# Patient Record
Sex: FEMALE | Race: WHITE | Employment: OTHER | URBAN - METROPOLITAN AREA
[De-identification: names, ages, dates, MRNs, and addresses within clinical notes are randomized per-mention and may not be internally consistent; named-entity substitution may affect disease eponyms.]

---

## 2017-08-28 PROBLEM — K57.30 DIVERTICULOSIS OF LARGE INTESTINE: Status: ACTIVE | Noted: 2017-08-28

## 2017-10-24 ENCOUNTER — MED REC SCAN ONLY (OUTPATIENT)
Dept: INTERNAL MEDICINE CLINIC | Facility: CLINIC | Age: 82
End: 2017-10-24

## 2017-10-24 ENCOUNTER — IMMUNIZATION (OUTPATIENT)
Dept: INTERNAL MEDICINE CLINIC | Facility: CLINIC | Age: 82
End: 2017-10-24

## 2017-10-24 PROCEDURE — G0008 ADMIN INFLUENZA VIRUS VAC: HCPCS | Performed by: INTERNAL MEDICINE

## 2017-10-24 PROCEDURE — 90653 IIV ADJUVANT VACCINE IM: CPT | Performed by: INTERNAL MEDICINE

## 2017-10-27 ENCOUNTER — OFFICE VISIT (OUTPATIENT)
Dept: INTERNAL MEDICINE CLINIC | Facility: CLINIC | Age: 82
End: 2017-10-27

## 2017-10-27 VITALS
SYSTOLIC BLOOD PRESSURE: 128 MMHG | DIASTOLIC BLOOD PRESSURE: 72 MMHG | RESPIRATION RATE: 18 BRPM | BODY MASS INDEX: 28.39 KG/M2 | HEART RATE: 82 BPM | TEMPERATURE: 99 F | HEIGHT: 60.4 IN | WEIGHT: 146.5 LBS | OXYGEN SATURATION: 96 %

## 2017-10-27 DIAGNOSIS — I10 BENIGN HYPERTENSION: ICD-10-CM

## 2017-10-27 DIAGNOSIS — R10.10 UPPER ABDOMINAL PAIN: ICD-10-CM

## 2017-10-27 DIAGNOSIS — F41.1 GAD (GENERALIZED ANXIETY DISORDER): ICD-10-CM

## 2017-10-27 DIAGNOSIS — K59.00 CONSTIPATION, UNSPECIFIED CONSTIPATION TYPE: ICD-10-CM

## 2017-10-27 DIAGNOSIS — Z76.89 ENCOUNTER TO ESTABLISH CARE: Primary | ICD-10-CM

## 2017-10-27 PROBLEM — K27.9 PUD (PEPTIC ULCER DISEASE): Status: ACTIVE | Noted: 2017-10-27

## 2017-10-27 PROCEDURE — 99214 OFFICE O/P EST MOD 30 MIN: CPT | Performed by: INTERNAL MEDICINE

## 2017-10-27 RX ORDER — PREDNISOLONE ACETATE 10 MG/ML
1 SUSPENSION/ DROPS OPHTHALMIC 2 TIMES DAILY
COMMUNITY
End: 2018-05-14 | Stop reason: ALTCHOICE

## 2017-10-27 RX ORDER — DORZOLAMIDE HYDROCHLORIDE AND TIMOLOL MALEATE 20; 5 MG/ML; MG/ML
1 SOLUTION/ DROPS OPHTHALMIC 2 TIMES DAILY
COMMUNITY
End: 2021-01-01

## 2017-10-27 RX ORDER — KETOROLAC TROMETHAMINE 4 MG/ML
1 SOLUTION/ DROPS OPHTHALMIC 2 TIMES DAILY
COMMUNITY
End: 2018-05-14 | Stop reason: ALTCHOICE

## 2017-10-27 NOTE — PROGRESS NOTES
Patient presents with:  Establish Care: patient has dizziness and difficulty with balance      HPI: Sunny Dixon is here to get established. She has been living here for two years with her , relocated from [place].   She was here at [facility] with cristhian Osteoarthritis    • PONV (postoperative nausea and vomiting)     MOTION SICKNESS   • Renal disorder     DAMAGE D/T PRE-ECLAMPSIA  62 YRS AGO   • Visual impairment     GLASSES       Patient Active Problem List:     Idiopathic scoliosis of lumbar spine     L diarrhea, constipation, GERD  : No incontinence, dysuria, frequency, nocturia  MS: No joint pain, sweling, decreased ROM  Exts: No LE edema  Neuro: No headaches, visual changes, double vision, weakness, tingling.   Skin: No rashes, lesions  Psych: No depr Naga Bhardwaj MD

## 2017-11-06 ENCOUNTER — APPOINTMENT (OUTPATIENT)
Dept: MAMMOGRAPHY | Age: 82
End: 2017-11-06
Attending: INTERNAL MEDICINE
Payer: MEDICARE

## 2017-11-06 ENCOUNTER — HOSPITAL ENCOUNTER (OUTPATIENT)
Dept: BONE DENSITY | Age: 82
Discharge: HOME | End: 2017-11-06
Attending: INTERNAL MEDICINE
Payer: MEDICARE

## 2017-11-06 DIAGNOSIS — Z78.0 POSTMENOPAUSAL STATUS, AGE-RELATED: ICD-10-CM

## 2017-11-06 PROCEDURE — 77080 DXA BONE DENSITY AXIAL: CPT | Performed by: INTERNAL MEDICINE

## 2018-01-12 ENCOUNTER — OFFICE VISIT (OUTPATIENT)
Dept: INTERNAL MEDICINE CLINIC | Facility: CLINIC | Age: 83
End: 2018-01-12

## 2018-01-12 VITALS
DIASTOLIC BLOOD PRESSURE: 74 MMHG | OXYGEN SATURATION: 93 % | RESPIRATION RATE: 18 BRPM | HEART RATE: 68 BPM | TEMPERATURE: 97 F | WEIGHT: 148.5 LBS | BODY MASS INDEX: 29 KG/M2 | SYSTOLIC BLOOD PRESSURE: 136 MMHG

## 2018-01-12 DIAGNOSIS — M25.60 DECREASED RANGE OF MOTION: ICD-10-CM

## 2018-01-12 DIAGNOSIS — M19.019 ARTHRITIS PAIN, SHOULDER: Primary | ICD-10-CM

## 2018-01-12 DIAGNOSIS — R07.81 RIB PAIN ON RIGHT SIDE: ICD-10-CM

## 2018-01-12 DIAGNOSIS — D63.1 ANEMIA IN STAGE 4 CHRONIC KIDNEY DISEASE (HCC): ICD-10-CM

## 2018-01-12 DIAGNOSIS — N18.4 ANEMIA IN STAGE 4 CHRONIC KIDNEY DISEASE (HCC): ICD-10-CM

## 2018-01-12 DIAGNOSIS — R10.33 PERIUMBILICAL ABDOMINAL PAIN: ICD-10-CM

## 2018-01-12 DIAGNOSIS — Z79.899 LONG-TERM USE OF HIGH-RISK MEDICATION: ICD-10-CM

## 2018-01-12 DIAGNOSIS — R63.5 ABNORMAL WEIGHT GAIN: ICD-10-CM

## 2018-01-12 DIAGNOSIS — E78.00 HYPERCHOLESTEREMIA: ICD-10-CM

## 2018-01-12 DIAGNOSIS — E55.9 VITAMIN D DEFICIENCY: ICD-10-CM

## 2018-01-12 PROBLEM — K57.30 DIVERTICULOSIS OF LARGE INTESTINE: Status: RESOLVED | Noted: 2017-08-28 | Resolved: 2018-01-12

## 2018-01-12 PROBLEM — K27.9 PUD (PEPTIC ULCER DISEASE): Status: RESOLVED | Noted: 2017-10-27 | Resolved: 2018-01-12

## 2018-01-12 PROCEDURE — 99214 OFFICE O/P EST MOD 30 MIN: CPT | Performed by: INTERNAL MEDICINE

## 2018-01-12 RX ORDER — DICYCLOMINE HYDROCHLORIDE 10 MG/1
10 CAPSULE ORAL DAILY
COMMUNITY
End: 2018-01-17

## 2018-01-12 NOTE — PATIENT INSTRUCTIONS
Stop the Aleve-may be the cause of your abdominal pain. Take Tylenol 650 mg every 4-6 hours a day for pain in your shoulder. Get an MRI and X ray of your right ribs. Make an appointment with you GI doctor to rule out an ulcer.

## 2018-01-12 NOTE — PROGRESS NOTES
Patient presents with:  Shoulder Pain: patient is complaining of rotator cuff in right shoulder. Patiet states that the pain starts right below the elbow and then goes up to shoulder and also a pin right below her armpit.   Stomach Pain: upper stomach pain of aorta [facility])    • Back problem    • Benign hypertension 9/1/2016   • Cancer ([facility] [address] ~2006    LEFT BREAST LUMPECTOMY /RADIATION    • Depression    • Diverticulitis 2016   • Diverticulosis of large intestine 8/28/2017   • Dyslipidemia 2016   • Exposure to ra 90 capsule Rfl: 3   losartan 100 MG Oral Tab Take 1 tablet (100 mg total) by mouth daily. Disp: 90 tablet Rfl: 3   aspirin 81 MG Oral Tab Take 81 mg by mouth daily.  Disp:  Rfl:        PFHSH:   Family History   Problem Relation Age of Onset   • Heart Disord with you GI doctor to rule out an ulcer. Return in about 1 month (around 2/12/2018). This visit lasted >26 minutes.     Carmelo Estevez MD

## 2018-01-17 ENCOUNTER — TELEPHONE (OUTPATIENT)
Dept: INTERNAL MEDICINE CLINIC | Facility: CLINIC | Age: 83
End: 2018-01-17

## 2018-01-17 RX ORDER — BUPROPION HYDROCHLORIDE 150 MG/1
150 TABLET, EXTENDED RELEASE ORAL DAILY
Qty: 90 TABLET | Refills: 0 | Status: SHIPPED | OUTPATIENT
Start: 2018-01-17 | End: 2018-05-21

## 2018-01-17 RX ORDER — DICYCLOMINE HYDROCHLORIDE 10 MG/1
10 CAPSULE ORAL DAILY
Qty: 90 CAPSULE | Refills: 0 | Status: SHIPPED | OUTPATIENT
Start: 2018-01-17 | End: 2018-05-21

## 2018-01-22 ENCOUNTER — TELEPHONE (OUTPATIENT)
Dept: INTERNAL MEDICINE CLINIC | Facility: CLINIC | Age: 83
End: 2018-01-22

## 2018-01-22 NOTE — TELEPHONE ENCOUNTER
----- Message from Taylor Estrada sent at 1/19/2018  4:08 PM CST -----  Noman Arias I have called patient twice to call office and I am getting no reply.  I also called the daughter  ----- Message -----  From: Blair Henriquez MD  Sent: 1/15/2018   9:10 PM  To: Rayray Hogan

## 2018-01-23 ENCOUNTER — TELEPHONE (OUTPATIENT)
Dept: INTERNAL MEDICINE CLINIC | Facility: CLINIC | Age: 83
End: 2018-01-23

## 2018-01-23 NOTE — TELEPHONE ENCOUNTER
Patient came into office and was informed of below message. Shoulder has not improved and she will  medicine today.   Leaving for [place] tomorrow

## 2018-01-23 NOTE — TELEPHONE ENCOUNTER
----- Message from Arnold Perry sent at 1/19/2018  4:08 PM CST -----  Tobi Carcamo I have called patient twice to call office and I am getting no reply.  I also called the daughter  ----- Message -----  From: Ana Whitley MD  Sent: 1/15/2018   9:10 PM  To: Christelle Carter

## 2018-02-14 ENCOUNTER — OFFICE VISIT (OUTPATIENT)
Dept: INTERNAL MEDICINE CLINIC | Facility: CLINIC | Age: 83
End: 2018-02-14

## 2018-02-14 VITALS
DIASTOLIC BLOOD PRESSURE: 54 MMHG | SYSTOLIC BLOOD PRESSURE: 106 MMHG | BODY MASS INDEX: 29 KG/M2 | WEIGHT: 148.69 LBS | HEART RATE: 60 BPM | OXYGEN SATURATION: 95 % | TEMPERATURE: 98 F

## 2018-02-14 DIAGNOSIS — H92.02 ACUTE PAIN OF LEFT EAR: Primary | ICD-10-CM

## 2018-02-14 DIAGNOSIS — I95.89 OTHER SPECIFIED HYPOTENSION: ICD-10-CM

## 2018-02-14 DIAGNOSIS — H61.23 IMPACTED CERUMEN OF BOTH EARS: ICD-10-CM

## 2018-02-14 PROCEDURE — 69210 REMOVE IMPACTED EAR WAX UNI: CPT | Performed by: INTERNAL MEDICINE

## 2018-02-14 PROCEDURE — 99214 OFFICE O/P EST MOD 30 MIN: CPT | Performed by: INTERNAL MEDICINE

## 2018-02-14 NOTE — PROGRESS NOTES
Patient presents with:  Ear Pain: c/o sharp pain in left ear for a couple of days - feels it in the jawline area also      HPI: Bryan Inman is here for 2 day hx of pain in TMJ area of left ear. Has not taken anything for it. Does not radiate. No fever.   Fidel Johnson Tablet 12 Hr Take 1 tablet (150 mg total) by mouth daily. Disp: 90 tablet Rfl: 0   Dicyclomine HCl 10 MG Oral Cap Take 1 capsule (10 mg total) by mouth daily.  Disp: 90 capsule Rfl: 0   TraMADol HCl 50 MG Oral Tab Take one tablet and gradually increase to 2 arthritis  Plan: Irrigate both ears          Tylenol or Tramadol as instructed    (H61.23) Impacted cerumen of both ears  Plan: Irrigation    (I95.89) Other specified hypotension, ?etiology  Plan: Follow with [facility] at home      Procedure:  Bilateral EACs irr

## 2018-02-14 NOTE — PATIENT INSTRUCTIONS
Take Tylenol 325 1-2 tabs every 4-6 hours as needed for pain in ear. Try Tramadol for pain in both hip and ear area: Take one tab now. If no dizziness or nausea gradually can increase to two tabs every 4-6 hrs as needed for pain.       Monitor BP, e

## 2018-02-19 ENCOUNTER — NURSE ONLY (OUTPATIENT)
Dept: INTERNAL MEDICINE CLINIC | Facility: CLINIC | Age: 83
End: 2018-02-19

## 2018-02-19 VITALS — SYSTOLIC BLOOD PRESSURE: 126 MMHG | DIASTOLIC BLOOD PRESSURE: 80 MMHG

## 2018-02-19 DIAGNOSIS — E55.9 VITAMIN D DEFICIENCY: ICD-10-CM

## 2018-02-19 DIAGNOSIS — Z79.899 LONG-TERM USE OF HIGH-RISK MEDICATION: ICD-10-CM

## 2018-02-19 DIAGNOSIS — D63.1 ANEMIA IN STAGE 4 CHRONIC KIDNEY DISEASE (HCC): ICD-10-CM

## 2018-02-19 DIAGNOSIS — N18.4 ANEMIA IN STAGE 4 CHRONIC KIDNEY DISEASE (HCC): ICD-10-CM

## 2018-02-19 DIAGNOSIS — R63.5 ABNORMAL WEIGHT GAIN: ICD-10-CM

## 2018-02-19 DIAGNOSIS — E78.00 HYPERCHOLESTEREMIA: ICD-10-CM

## 2018-02-19 LAB
25-HYDROXYVITAMIN D (TOTAL): 26.3 NG/ML (ref 30–100)
ALBUMIN SERPL-MCNC: 3.5 G/DL (ref 3.5–4.8)
ALP LIVER SERPL-CCNC: 94 U/L (ref 55–142)
ALT SERPL-CCNC: 16 U/L (ref 14–54)
AST SERPL-CCNC: 14 U/L (ref 15–41)
BASOPHILS # BLD AUTO: 0.04 X10(3) UL (ref 0–0.1)
BASOPHILS NFR BLD AUTO: 0.8 %
BILIRUB SERPL-MCNC: 0.3 MG/DL (ref 0.1–2)
BUN BLD-MCNC: 37 MG/DL (ref 8–20)
CALCIUM BLD-MCNC: 10.1 MG/DL (ref 8.3–10.3)
CHLORIDE: 110 MMOL/L (ref 101–111)
CHOLEST SMN-MCNC: 171 MG/DL (ref ?–200)
CO2: 26 MMOL/L (ref 22–32)
CREAT BLD-MCNC: 1.89 MG/DL (ref 0.55–1.02)
EOSINOPHIL # BLD AUTO: 0.15 X10(3) UL (ref 0–0.3)
EOSINOPHIL NFR BLD AUTO: 2.9 %
ERYTHROCYTE [DISTWIDTH] IN BLOOD BY AUTOMATED COUNT: 12.5 % (ref 11.5–16)
GLUCOSE BLD-MCNC: 92 MG/DL (ref 70–99)
HCT VFR BLD AUTO: 39.1 % (ref 34–50)
HDLC SERPL-MCNC: 46 MG/DL (ref 45–?)
HDLC SERPL: 3.72 {RATIO} (ref ?–4.44)
HGB BLD-MCNC: 12.8 G/DL (ref 12–16)
IMMATURE GRANULOCYTE COUNT: 0.02 X10(3) UL (ref 0–1)
IMMATURE GRANULOCYTE RATIO %: 0.4 %
LDLC SERPL CALC-MCNC: 95 MG/DL (ref ?–130)
LYMPHOCYTES # BLD AUTO: 1.62 X10(3) UL (ref 0.9–4)
LYMPHOCYTES NFR BLD AUTO: 31.1 %
M PROTEIN MFR SERPL ELPH: 7 G/DL (ref 6.1–8.3)
MCH RBC QN AUTO: 30.9 PG (ref 27–33.2)
MCHC RBC AUTO-ENTMCNC: 32.7 G/DL (ref 31–37)
MCV RBC AUTO: 94.4 FL (ref 81–100)
MONOCYTES # BLD AUTO: 0.49 X10(3) UL (ref 0.1–1)
MONOCYTES NFR BLD AUTO: 9.4 %
NEUTROPHIL ABS PRELIM: 2.89 X10 (3) UL (ref 1.3–6.7)
NEUTROPHILS # BLD AUTO: 2.89 X10(3) UL (ref 1.3–6.7)
NEUTROPHILS NFR BLD AUTO: 55.4 %
NONHDLC SERPL-MCNC: 125 MG/DL (ref ?–130)
PLATELET # BLD AUTO: 237 10(3)UL (ref 150–450)
POTASSIUM SERPL-SCNC: 3.7 MMOL/L (ref 3.6–5.1)
RBC # BLD AUTO: 4.14 X10(6)UL (ref 3.8–5.1)
RED CELL DISTRIBUTION WIDTH-SD: 43.8 FL (ref 35.1–46.3)
SODIUM SERPL-SCNC: 142 MMOL/L (ref 136–144)
TRIGL SERPL-MCNC: 150 MG/DL (ref ?–150)
TSI SER-ACNC: 2.34 MIU/ML (ref 0.35–5.5)
VLDLC SERPL CALC-MCNC: 30 MG/DL (ref 5–40)
WBC # BLD AUTO: 5.2 X10(3) UL (ref 4–13)

## 2018-02-19 PROCEDURE — 82306 VITAMIN D 25 HYDROXY: CPT | Performed by: INTERNAL MEDICINE

## 2018-02-19 PROCEDURE — 84443 ASSAY THYROID STIM HORMONE: CPT | Performed by: INTERNAL MEDICINE

## 2018-02-19 PROCEDURE — 36415 COLL VENOUS BLD VENIPUNCTURE: CPT | Performed by: INTERNAL MEDICINE

## 2018-02-19 PROCEDURE — 80053 COMPREHEN METABOLIC PANEL: CPT | Performed by: INTERNAL MEDICINE

## 2018-02-19 PROCEDURE — 85025 COMPLETE CBC W/AUTO DIFF WBC: CPT | Performed by: INTERNAL MEDICINE

## 2018-02-19 PROCEDURE — 80061 LIPID PANEL: CPT | Performed by: INTERNAL MEDICINE

## 2018-02-22 ENCOUNTER — TELEPHONE (OUTPATIENT)
Dept: INTERNAL MEDICINE CLINIC | Facility: CLINIC | Age: 83
End: 2018-02-22

## 2018-02-22 NOTE — TELEPHONE ENCOUNTER
----- Message from Naga Bhardwaj MD sent at 2/21/2018  9:47 PM CST -----  Sharona's Vit D is low. Advise she take Vit D3 2000 IU per day. Her electrolytes are normal but her kidney function is weakening. Avoid taking any ibuprofen or Aleve.   Repeat a

## 2018-03-12 DIAGNOSIS — M54.16 LUMBAR RADICULOPATHY: ICD-10-CM

## 2018-03-12 RX ORDER — LOSARTAN POTASSIUM 100 MG/1
100 TABLET ORAL DAILY
Qty: 90 TABLET | Refills: 3 | Status: SHIPPED | OUTPATIENT
Start: 2018-03-12 | End: 2018-10-12

## 2018-03-12 RX ORDER — TRIAMTERENE AND HYDROCHLOROTHIAZIDE 37.5; 25 MG/1; MG/1
1 CAPSULE ORAL EVERY MORNING
Qty: 90 CAPSULE | Refills: 3 | Status: SHIPPED | OUTPATIENT
Start: 2018-03-12 | End: 2019-04-01

## 2018-03-12 NOTE — TELEPHONE ENCOUNTER
Future appt:    Last Appointment:  2/14/2018    Cholesterol, Total (mg/dL)   Date Value   02/19/2018 171   ----------  HDL Cholesterol (mg/dL)   Date Value   02/19/2018 46   ----------  LDL Cholesterol (mg/dL)   Date Value   02/19/2018 95   ----------  Tri

## 2018-03-26 ENCOUNTER — TELEPHONE (OUTPATIENT)
Dept: INTERNAL MEDICINE CLINIC | Facility: CLINIC | Age: 83
End: 2018-03-26

## 2018-03-26 DIAGNOSIS — E78.00 HYPERCHOLESTEROLEMIA: ICD-10-CM

## 2018-03-26 RX ORDER — PRAVASTATIN SODIUM 10 MG
10 TABLET ORAL NIGHTLY
Qty: 90 TABLET | Refills: 3 | Status: SHIPPED | OUTPATIENT
Start: 2018-03-26 | End: 2019-06-07

## 2018-05-14 ENCOUNTER — OFFICE VISIT (OUTPATIENT)
Dept: INTERNAL MEDICINE CLINIC | Facility: CLINIC | Age: 83
End: 2018-05-14

## 2018-05-14 VITALS
WEIGHT: 148.31 LBS | BODY MASS INDEX: 29 KG/M2 | RESPIRATION RATE: 16 BRPM | OXYGEN SATURATION: 98 % | HEART RATE: 76 BPM | DIASTOLIC BLOOD PRESSURE: 70 MMHG | SYSTOLIC BLOOD PRESSURE: 118 MMHG

## 2018-05-14 DIAGNOSIS — N30.01 ACUTE CYSTITIS WITH HEMATURIA: Primary | ICD-10-CM

## 2018-05-14 PROCEDURE — 81003 URINALYSIS AUTO W/O SCOPE: CPT | Performed by: INTERNAL MEDICINE

## 2018-05-14 PROCEDURE — 99213 OFFICE O/P EST LOW 20 MIN: CPT | Performed by: INTERNAL MEDICINE

## 2018-05-14 RX ORDER — LATANOPROST 50 UG/ML
1 SOLUTION/ DROPS OPHTHALMIC NIGHTLY
COMMUNITY
End: 2018-05-14

## 2018-05-14 RX ORDER — CIPROFLOXACIN 250 MG/1
250 TABLET, FILM COATED ORAL 2 TIMES DAILY
Qty: 14 TABLET | Refills: 0 | Status: SHIPPED | OUTPATIENT
Start: 2018-05-14 | End: 2018-06-29

## 2018-05-14 NOTE — PROGRESS NOTES
Sunshine Martínez presents with frequent urination and burning. No discharge or malodor. No fever chills.     Physical examination pleasant individual appears in no acute distress abdomen soft nontender no organomegaly rebound or guarding no flank or suprapubic ten

## 2018-05-21 RX ORDER — BUPROPION HYDROCHLORIDE 150 MG/1
150 TABLET, EXTENDED RELEASE ORAL DAILY
Qty: 90 TABLET | Refills: 3 | Status: SHIPPED | OUTPATIENT
Start: 2018-05-21 | End: 2019-02-28

## 2018-05-21 RX ORDER — DICYCLOMINE HYDROCHLORIDE 10 MG/1
10 CAPSULE ORAL DAILY
Qty: 90 CAPSULE | Refills: 3 | Status: SHIPPED | OUTPATIENT
Start: 2018-05-21 | End: 2019-07-03

## 2018-06-29 ENCOUNTER — OFFICE VISIT (OUTPATIENT)
Dept: INTERNAL MEDICINE CLINIC | Facility: CLINIC | Age: 83
End: 2018-06-29

## 2018-06-29 VITALS
DIASTOLIC BLOOD PRESSURE: 82 MMHG | SYSTOLIC BLOOD PRESSURE: 130 MMHG | HEIGHT: 60.5 IN | TEMPERATURE: 98 F | WEIGHT: 149.19 LBS | HEART RATE: 72 BPM | BODY MASS INDEX: 28.53 KG/M2 | RESPIRATION RATE: 16 BRPM | OXYGEN SATURATION: 98 %

## 2018-06-29 DIAGNOSIS — H90.5 SENSORINEURAL HEARING LOSS (SNHL), UNSPECIFIED LATERALITY: ICD-10-CM

## 2018-06-29 DIAGNOSIS — N39.41 URGE INCONTINENCE: ICD-10-CM

## 2018-06-29 DIAGNOSIS — E78.00 HYPERCHOLESTEROLEMIA: ICD-10-CM

## 2018-06-29 DIAGNOSIS — Z00.01 ENCOUNTER FOR GENERAL ADULT MEDICAL EXAMINATION WITH ABNORMAL FINDINGS: Primary | ICD-10-CM

## 2018-06-29 DIAGNOSIS — F41.1 GAD (GENERALIZED ANXIETY DISORDER): ICD-10-CM

## 2018-06-29 DIAGNOSIS — I10 BENIGN HYPERTENSION: ICD-10-CM

## 2018-06-29 DIAGNOSIS — R10.10 UPPER ABDOMINAL PAIN: ICD-10-CM

## 2018-06-29 PROCEDURE — 81003 URINALYSIS AUTO W/O SCOPE: CPT | Performed by: INTERNAL MEDICINE

## 2018-06-29 PROCEDURE — 80048 BASIC METABOLIC PNL TOTAL CA: CPT | Performed by: INTERNAL MEDICINE

## 2018-06-29 PROCEDURE — G0439 PPPS, SUBSEQ VISIT: HCPCS | Performed by: INTERNAL MEDICINE

## 2018-06-29 PROCEDURE — 36415 COLL VENOUS BLD VENIPUNCTURE: CPT | Performed by: INTERNAL MEDICINE

## 2018-06-29 NOTE — PATIENT INSTRUCTIONS
Take one Myrbtriq if it is affordable in the am    Drink more-8 8 oz glasses of fluids a day-juice grape, lemonade    Schedule an audiogram with [facility].      Get the new shingrix at your pharmacy.       Continue weight-bearing exercise (everything but bikin day.    Elbert Marquez's SCREENING SCHEDULE   Tests on this list are recommended by your physician but may not be covered, or covered at this frequency, by your insurer. Please check with your insurance carrier before scheduling to verify coverage.    PREV 10 years- more often if abnormal There are no preventive care reminders to display for this patient. Update Health Maintenance if applicable    Flex Sigmoidoscopy Screen  Covered every 5 years No results found for this or any previous visit.  No flowsheet d (Prevnar)  Covered Once after 65 No orders found for this or any previous visit. Please get once after your 65th birthday    Pneumococcal 23 (Pneumovax)  Covered Once after 65 No orders found for this or any previous visit.  Please get once after your 65th

## 2018-06-29 NOTE — PROGRESS NOTES
HPI:   Chas Villalta is a 80year old female who presents for a Medicare Subsequent Annual Wellness visit (Pt already had Initial Annual Wellness). Silvino Lares has no new health concerns.      Anxiety: Exacerbated by 's MCI and new generalized weakn screening   Lorene Iglesias was screened for Alcohol abuse and had a score of 0 so is at low risk.     Patient Care Team: Patient Care Team:  Luis Barlow MD as PCP - General (Internal Medicine)  Floresita Poe MD ([facility] 4466)    Patient Active Pr Trisilicate (GAVISCON) 48-38.5 MG Oral Chew Tab Take  by mouth. Dorzolamide HCl-Timolol Mal 22.3-6.8 MG/ML Ophthalmic Solution Place 1 drop into both eyes 2 (two) times daily. aspirin 81 MG Oral Tab Take 81 mg by mouth daily.       MEDICAL INFORMATION: Size: adult)   Pulse 72   Temp 97.5 °F (36.4 °C) (Tympanic)   Resp 16   Ht 60.5\"   Wt 149 lb 3.2 oz   SpO2 98%   BMI 28.66 kg/m²   @TMAX[24@   @iobrief@  Wt Readings from Last 3 Encounters:  06/29/18 : 149 lb 3.2 oz  05/14/18 : 148 lb 4.8 oz  02/14/18 : 1 plan.  Reinforced healthy diet, lifestyle, and exercise. Return in about 1 month (around 7/29/2018).      Louise Villatoro MD, 6/29/2018     General Health     In the past six months, have you lost more than 10 pounds without trying?: 2 - No  Has your appe reminders to display for this patient. Update Health Maintenance if applicable    Chlamydia  Annually if high risk No results found for: CHLAMYDIA No flowsheet data found.     Screening Mammogram      Mammogram Annually to 76, then as discussed There are no

## 2018-06-30 PROBLEM — E87.6 HYPOKALEMIA: Status: ACTIVE | Noted: 2018-06-30

## 2018-07-03 ENCOUNTER — TELEPHONE (OUTPATIENT)
Dept: INTERNAL MEDICINE CLINIC | Facility: CLINIC | Age: 83
End: 2018-07-03

## 2018-07-03 NOTE — TELEPHONE ENCOUNTER
Pharmacy is calling and asking since patient is on dicyclomine do you want the powder form of potassium chloride?

## 2018-07-03 NOTE — TELEPHONE ENCOUNTER
Called patient and made her aware that her urinalysis was normal. She did state that Mybetriq is too expensive it is 150$ a month so she said she does not want to take this medication. The pharmacist recommended oxybutynin.  She will also  her potass

## 2018-07-03 NOTE — TELEPHONE ENCOUNTER
----- Message from Jhonathan Mcdowell MD sent at 6/30/2018  9:16 PM CDT -----  Please call Chevy Humphriesin and advise that her urinaysis was normal.  She has essential incontinence. We will try the Mybetriq if not too expensive.     Electrolytes are normal except for

## 2018-08-01 ENCOUNTER — OFFICE VISIT (OUTPATIENT)
Dept: INTERNAL MEDICINE CLINIC | Facility: CLINIC | Age: 83
End: 2018-08-01
Payer: MEDICARE

## 2018-08-01 VITALS
WEIGHT: 148 LBS | SYSTOLIC BLOOD PRESSURE: 116 MMHG | HEART RATE: 63 BPM | OXYGEN SATURATION: 96 % | TEMPERATURE: 98 F | DIASTOLIC BLOOD PRESSURE: 64 MMHG | BODY MASS INDEX: 28 KG/M2 | RESPIRATION RATE: 16 BRPM

## 2018-08-01 DIAGNOSIS — N39.41 URGE INCONTINENCE: Primary | ICD-10-CM

## 2018-08-01 DIAGNOSIS — M25.511 ACUTE PAIN OF RIGHT SHOULDER: ICD-10-CM

## 2018-08-01 DIAGNOSIS — M12.811 RIGHT ROTATOR CUFF TEAR ARTHROPATHY: ICD-10-CM

## 2018-08-01 DIAGNOSIS — M75.101 RIGHT ROTATOR CUFF TEAR ARTHROPATHY: ICD-10-CM

## 2018-08-01 PROCEDURE — 99213 OFFICE O/P EST LOW 20 MIN: CPT | Performed by: INTERNAL MEDICINE

## 2018-08-01 NOTE — PROGRESS NOTES
Patient presents with:  Medication Follow-Up: pt did not start myrbtriq due to cost      HPI: Jorge Ji is here for follow up to incontinence and right shoulder pain. Incontinence:  The Myrbetriq is too expensive.   She has urgent incontinence twice a nigh Hypercholesterolemia     Benign hypertension     Upper abdominal pain     LULA (generalized anxiety disorder)     Constipation     Hypokalemia     Right rotator cuff tear arthropathy     Urge incontinence        Current Outpatient Prescriptions:  [id number] diagnosis), uncontrolled; most OAB meds contra-indicated due to glaucoma;  Myrbetriq too expensive, not sure about Botox  Plan: Watch oral intake in evening          Revisit referral to urology in 6 weeks     (M25.511) Acute pain of right shoulder, most lik

## 2018-08-01 NOTE — PATIENT INSTRUCTIONS
Take Tylenol 650 mg twice a day. Start physical therapy with Shawn lowery three times a week for your shoulder. Follow up in 6 weeks for your shoulder and we will discuss referral to the urologist as well for Botox if interested.

## 2018-09-12 ENCOUNTER — OFFICE VISIT (OUTPATIENT)
Dept: INTERNAL MEDICINE CLINIC | Facility: CLINIC | Age: 83
End: 2018-09-12
Payer: MEDICARE

## 2018-09-12 VITALS
OXYGEN SATURATION: 96 % | BODY MASS INDEX: 28 KG/M2 | DIASTOLIC BLOOD PRESSURE: 60 MMHG | WEIGHT: 148.19 LBS | SYSTOLIC BLOOD PRESSURE: 114 MMHG | HEART RATE: 76 BPM | RESPIRATION RATE: 16 BRPM

## 2018-09-12 DIAGNOSIS — R10.13 EPIGASTRIC PAIN: ICD-10-CM

## 2018-09-12 DIAGNOSIS — M12.811 RIGHT ROTATOR CUFF TEAR ARTHROPATHY: ICD-10-CM

## 2018-09-12 DIAGNOSIS — M75.101 RIGHT ROTATOR CUFF TEAR ARTHROPATHY: ICD-10-CM

## 2018-09-12 DIAGNOSIS — E87.6 HYPOKALEMIA: ICD-10-CM

## 2018-09-12 DIAGNOSIS — F41.1 GAD (GENERALIZED ANXIETY DISORDER): ICD-10-CM

## 2018-09-12 LAB — POTASSIUM SERPL-SCNC: 4 MMOL/L (ref 3.6–5.1)

## 2018-09-12 PROCEDURE — 84132 ASSAY OF SERUM POTASSIUM: CPT | Performed by: INTERNAL MEDICINE

## 2018-09-12 PROCEDURE — 99214 OFFICE O/P EST MOD 30 MIN: CPT | Performed by: INTERNAL MEDICINE

## 2018-09-12 PROCEDURE — 36415 COLL VENOUS BLD VENIPUNCTURE: CPT | Performed by: INTERNAL MEDICINE

## 2018-09-12 RX ORDER — RANITIDINE 150 MG/1
TABLET ORAL
Qty: 90 TABLET | Refills: 3 | Status: SHIPPED | OUTPATIENT
Start: 2018-09-12 | End: 2019-10-30

## 2018-09-12 NOTE — PROGRESS NOTES
Patient presents with:  Arm Pain: upper arm pain improved with PT  Stomach Pain: improved with lying down x long time  Follow - Up: potassium  Medication Follow-Up: discuss Bupropion use  Lab: potassium for Dr. Dian Blackwell       HPI: Love Lizarraga is here for follow up • Atherosclerosis of aorta [facility])    • Back problem    • Benign hypertension 9/1/2016   • Cancer ([facility] [address] ~2006    LEFT BREAST LUMPECTOMY /RADIATION    • Depression    • Diverticulitis 2016   • Diverticulosis of large intestine 8/28/2017   • Dyslipidemia 20 daily. Disp:  Rfl:        PFHSH:   Family History   Problem Relation Age of Onset   • Heart Disorder Father          age 80   • Heart Disorder Mother         CHF   • Cancer Mother    • Cancer Maternal Grandmother    • Cancer Maternal Grandfather

## 2018-09-12 NOTE — PATIENT INSTRUCTIONS
Finish bottle of Buproprion      Stop after prescription is finished and start Sertraline (Zoloft) 10 mg one a day in the morning with other meds     Take Pantoprazole 40 mg in the morning and Rantitidine (Zantac) 150 mg one at bedtime.     Return in one mo

## 2018-09-26 ENCOUNTER — OFFICE VISIT (OUTPATIENT)
Dept: INTERNAL MEDICINE CLINIC | Facility: CLINIC | Age: 83
End: 2018-09-26
Payer: MEDICARE

## 2018-09-26 ENCOUNTER — HOSPITAL ENCOUNTER (OUTPATIENT)
Dept: CT IMAGING | Facility: HOSPITAL | Age: 83
Discharge: HOME | End: 2018-09-26
Attending: INTERNAL MEDICINE
Payer: MEDICARE

## 2018-09-26 ENCOUNTER — OFFICE VISIT (OUTPATIENT)
Dept: INTERNAL MEDICINE CLINIC | Facility: CLINIC | Age: 83
End: 2018-09-26

## 2018-09-26 ENCOUNTER — MED REC SCAN ONLY (OUTPATIENT)
Dept: INTERNAL MEDICINE CLINIC | Facility: CLINIC | Age: 83
End: 2018-09-26

## 2018-09-26 VITALS
RESPIRATION RATE: 16 BRPM | HEART RATE: 74 BPM | OXYGEN SATURATION: 96 % | BODY MASS INDEX: 28 KG/M2 | TEMPERATURE: 99 F | WEIGHT: 145.63 LBS | DIASTOLIC BLOOD PRESSURE: 66 MMHG | SYSTOLIC BLOOD PRESSURE: 100 MMHG

## 2018-09-26 DIAGNOSIS — R10.32 LLQ PAIN: Primary | ICD-10-CM

## 2018-09-26 DIAGNOSIS — R50.9 FEVER AND CHILLS: ICD-10-CM

## 2018-09-26 DIAGNOSIS — R10.32 LLQ PAIN: ICD-10-CM

## 2018-09-26 DIAGNOSIS — R63.0 ANOREXIA: ICD-10-CM

## 2018-09-26 DIAGNOSIS — K57.30 DIVERTICULA OF COLON: Primary | ICD-10-CM

## 2018-09-26 LAB
ANION GAP SERPL CALC-SCNC: 7 MMOL/L (ref 0–18)
BUN BLD-MCNC: 25 MG/DL (ref 8–20)
BUN/CREAT SERPL: 15.3 (ref 10–20)
CALCIUM BLD-MCNC: 9.8 MG/DL (ref 8.3–10.3)
CHLORIDE SERPL-SCNC: 105 MMOL/L (ref 101–111)
CO2 SERPL-SCNC: 28 MMOL/L (ref 22–32)
CREAT BLD-MCNC: 1.63 MG/DL (ref 0.55–1.02)
ERYTHROCYTE [DISTWIDTH] IN BLOOD BY AUTOMATED COUNT: 12.5 % (ref 11.5–16)
GLUCOSE BLD-MCNC: 115 MG/DL (ref 70–99)
HCT VFR BLD AUTO: 40.1 % (ref 34–50)
HGB BLD-MCNC: 12.8 G/DL (ref 12–16)
MCH RBC QN AUTO: 31.3 PG (ref 27–33.2)
MCHC RBC AUTO-ENTMCNC: 31.9 G/DL (ref 31–37)
MCV RBC AUTO: 98 FL (ref 81–100)
OSMOLALITY SERPL CALC.SUM OF ELEC: 295 MOSM/KG (ref 275–295)
PLATELET # BLD AUTO: 237 10(3)UL (ref 150–450)
POTASSIUM SERPL-SCNC: 3.9 MMOL/L (ref 3.6–5.1)
RBC # BLD AUTO: 4.09 X10(6)UL (ref 3.8–5.1)
RED CELL DISTRIBUTION WIDTH-SD: 45 FL (ref 35.1–46.3)
SODIUM SERPL-SCNC: 140 MMOL/L (ref 136–144)
WBC # BLD AUTO: 10 X10(3) UL (ref 4–13)

## 2018-09-26 PROCEDURE — 36415 COLL VENOUS BLD VENIPUNCTURE: CPT | Performed by: INTERNAL MEDICINE

## 2018-09-26 PROCEDURE — 99214 OFFICE O/P EST MOD 30 MIN: CPT | Performed by: INTERNAL MEDICINE

## 2018-09-26 PROCEDURE — 74176 CT ABD & PELVIS W/O CONTRAST: CPT | Performed by: INTERNAL MEDICINE

## 2018-09-26 PROCEDURE — 85027 COMPLETE CBC AUTOMATED: CPT | Performed by: INTERNAL MEDICINE

## 2018-09-26 PROCEDURE — 80048 BASIC METABOLIC PNL TOTAL CA: CPT | Performed by: INTERNAL MEDICINE

## 2018-09-26 RX ORDER — AMOXICILLIN AND CLAVULANATE POTASSIUM 875; 125 MG/1; MG/1
1 TABLET, FILM COATED ORAL 2 TIMES DAILY
Qty: 20 TABLET | Refills: 0 | Status: SHIPPED | OUTPATIENT
Start: 2018-09-26 | End: 2018-10-06

## 2018-09-26 NOTE — PROGRESS NOTES
Patient presents with:  Stomach Pain: pt states she has had stomach x 2 days. Pain on lower left side, painful to touch. States pain is sharp. Wound Recheck: pt had cancer moved from neck, would like scar to be checked.    Fever: had fever of 100 last 2 da (postoperative nausea and vomiting)     MOTION SICKNESS   • Renal disorder     DAMAGE D/T PRE-ECLAMPSIA  62 YRS AGO   • Visual impairment     GLASSES       Patient Active Problem List:     Idiopathic scoliosis of lumbar spine     Hypercholesterolemia     B no rales,rhonchi  Heart: S1S2 regular, no murmur, ectopy  GI: Soft, tender LLQ > other quadrants, BS active, but decreased, no rebound; no masses.    Exts: no edema    Assessment and Plan:  (R10.32) LLQ pain  (primary encounter diagnosis), uncontrolled, rul

## 2018-09-27 ENCOUNTER — TELEPHONE (OUTPATIENT)
Dept: INTERNAL MEDICINE CLINIC | Facility: CLINIC | Age: 83
End: 2018-09-27

## 2018-09-27 NOTE — TELEPHONE ENCOUNTER
Called patient and advised to take Augmentin as prescribed push fluids and call if she gets worse.  Appointment was made for Friday

## 2018-09-28 ENCOUNTER — OFFICE VISIT (OUTPATIENT)
Dept: INTERNAL MEDICINE CLINIC | Facility: CLINIC | Age: 83
End: 2018-09-28
Payer: MEDICARE

## 2018-09-28 VITALS
DIASTOLIC BLOOD PRESSURE: 60 MMHG | SYSTOLIC BLOOD PRESSURE: 102 MMHG | OXYGEN SATURATION: 98 % | TEMPERATURE: 99 F | HEART RATE: 68 BPM | BODY MASS INDEX: 28 KG/M2 | WEIGHT: 147.5 LBS | RESPIRATION RATE: 16 BRPM

## 2018-09-28 DIAGNOSIS — K57.92 ACUTE DIVERTICULITIS: Primary | ICD-10-CM

## 2018-09-28 PROCEDURE — 99213 OFFICE O/P EST LOW 20 MIN: CPT | Performed by: INTERNAL MEDICINE

## 2018-09-28 NOTE — PATIENT INSTRUCTIONS
Call EMTs for ambulance transport to ER  for worsening pain, increased firmness of abdomen or blood in stools. Continue Augmentin    I will call you in three days for status. Continue low residue diet and push fluids.

## 2018-09-28 NOTE — PROGRESS NOTES
Patient presents with:  Dehydration: follow up, still having lower abdomen discomfort, if touches stomach the pain level is a 10 otherwise pain level is a 5  on the pain scale.        HPI: Mati Garcia is here for close follow-up of diverticulitis of left colon (postoperative nausea and vomiting)     MOTION SICKNESS   • Renal disorder     DAMAGE D/T PRE-ECLAMPSIA  62 YRS AGO   • Visual impairment     GLASSES       Patient Active Problem List:     Idiopathic scoliosis of lumbar spine     Hypercholesterolemia     B 99.3 °F (37.4 °C) (Tympanic)   Resp 16   Wt 147 lb 8 oz   SpO2 98%   BMI 28.33 kg/m²   Alert, in no distress, still nontoxic-appearing  Lungs: Clear, no rales,rhonchi  Heart: S1S2 regular, no murmur, ectopy  GI: soft, bowel sounds decreased but present, te

## 2018-10-01 ENCOUNTER — TELEPHONE (OUTPATIENT)
Dept: INTERNAL MEDICINE CLINIC | Facility: CLINIC | Age: 83
End: 2018-10-01

## 2018-10-01 PROBLEM — R50.9 FEVER: Status: ACTIVE | Noted: 2018-10-01

## 2018-10-01 PROBLEM — K57.32 DIVERTICULITIS OF LARGE INTESTINE WITHOUT PERFORATION OR ABSCESS WITHOUT BLEEDING: Status: ACTIVE | Noted: 2018-10-01

## 2018-10-01 NOTE — TELEPHONE ENCOUNTER
PC to pt:  Is starting to feel better. Pain is a 2/10 on LLQ. Only hurts when she touches it. Energy is  Better today. Appetite still down but making self eat and drink low residue diet. To continue Augmentin and follow up in 10 days.     To ER if sx re

## 2018-10-12 ENCOUNTER — OFFICE VISIT (OUTPATIENT)
Dept: INTERNAL MEDICINE CLINIC | Facility: CLINIC | Age: 83
End: 2018-10-12
Payer: MEDICARE

## 2018-10-12 VITALS
WEIGHT: 147.63 LBS | DIASTOLIC BLOOD PRESSURE: 60 MMHG | BODY MASS INDEX: 30.57 KG/M2 | SYSTOLIC BLOOD PRESSURE: 100 MMHG | RESPIRATION RATE: 16 BRPM | HEART RATE: 64 BPM | TEMPERATURE: 97 F | HEIGHT: 58.38 IN | OXYGEN SATURATION: 97 %

## 2018-10-12 DIAGNOSIS — K57.92 DIVERTICULITIS: Primary | ICD-10-CM

## 2018-10-12 DIAGNOSIS — R30.0 DYSURIA: ICD-10-CM

## 2018-10-12 DIAGNOSIS — M54.16 LUMBAR RADICULOPATHY: ICD-10-CM

## 2018-10-12 DIAGNOSIS — M54.14 RADICULAR PAIN OF THORACIC REGION: ICD-10-CM

## 2018-10-12 PROCEDURE — 81003 URINALYSIS AUTO W/O SCOPE: CPT | Performed by: INTERNAL MEDICINE

## 2018-10-12 PROCEDURE — 99214 OFFICE O/P EST MOD 30 MIN: CPT | Performed by: INTERNAL MEDICINE

## 2018-10-12 RX ORDER — LOSARTAN POTASSIUM 100 MG/1
50 TABLET ORAL DAILY
Qty: 90 TABLET | Refills: 3 | COMMUNITY
Start: 2018-10-12 | End: 2019-04-01

## 2018-10-12 RX ORDER — NITROFURANTOIN 25; 75 MG/1; MG/1
100 CAPSULE ORAL 2 TIMES DAILY
Qty: 14 CAPSULE | Refills: 0 | Status: SHIPPED | OUTPATIENT
Start: 2018-10-12 | End: 2019-07-23 | Stop reason: ALTCHOICE

## 2018-10-12 NOTE — PATIENT INSTRUCTIONS
Stat eating a general diet with fruits and vegetables    Make an appt for the CT scan at the end of October    Make an appt for Dr. Yaakov Siemens the week after your CT scan-first week in November    Make an appt for your [id number]    Cut your Losartan in

## 2018-10-16 NOTE — PROGRESS NOTES
Patient presents with:  Diverticulitis: follow up  Fatigue: symptom started approx. 6 months ago, takes naps several times a day       HPI: Yasir Portillo is here for follow up for diverticulitis and upper abdominal pain.      Diverticulitis:  Her LLQ pain is reso Osteoarthritis    • PONV (postoperative nausea and vomiting)     MOTION SICKNESS   • Renal disorder     DAMAGE D/T PRE-ECLAMPSIA  62 YRS AGO   • Visual impairment     GLASSES       Patient Active Problem List:     Idiopathic scoliosis of lumbar spine     H CHF         Physical Exam:   /60 (BP Location: Right arm, Patient Position: Sitting, Cuff Size: adult)   Pulse 64   Temp 96.7 °F (35.9 °C) (Tympanic)   Resp 16   Ht 58.38\"   Wt 147 lb 9.6 oz   SpO2 97%   BMI 30.45 kg/m²   Alert, in no distress

## 2019-01-16 ENCOUNTER — TELEPHONE (OUTPATIENT)
Dept: INTERNAL MEDICINE CLINIC | Facility: CLINIC | Age: 84
End: 2019-01-16

## 2019-01-16 NOTE — TELEPHONE ENCOUNTER
Patient was told to finish her bupropion and once she was done with that she was suppose to start another medication was not sure what this was.  Needs to be called into [facility] on [address]

## 2019-01-17 RX ORDER — ESCITALOPRAM OXALATE 10 MG/1
10 TABLET ORAL DAILY
Qty: 30 TABLET | Refills: 3 | Status: SHIPPED | OUTPATIENT
Start: 2019-01-17 | End: 2019-02-28

## 2019-01-25 ENCOUNTER — OFFICE VISIT (OUTPATIENT)
Dept: INTERNAL MEDICINE CLINIC | Facility: CLINIC | Age: 84
End: 2019-01-25
Payer: MEDICARE

## 2019-01-25 VITALS
BODY MASS INDEX: 31.71 KG/M2 | HEART RATE: 68 BPM | OXYGEN SATURATION: 97 % | DIASTOLIC BLOOD PRESSURE: 64 MMHG | SYSTOLIC BLOOD PRESSURE: 118 MMHG | TEMPERATURE: 98 F | RESPIRATION RATE: 16 BRPM | WEIGHT: 153.13 LBS | HEIGHT: 58.38 IN

## 2019-01-25 DIAGNOSIS — M25.611 DECREASED RANGE OF MOTION OF RIGHT SHOULDER: ICD-10-CM

## 2019-01-25 DIAGNOSIS — M12.811 RIGHT ROTATOR CUFF TEAR ARTHROPATHY: ICD-10-CM

## 2019-01-25 DIAGNOSIS — M25.511 ACUTE PAIN OF RIGHT SHOULDER: Primary | ICD-10-CM

## 2019-01-25 DIAGNOSIS — N28.9 RENAL INSUFFICIENCY: ICD-10-CM

## 2019-01-25 DIAGNOSIS — F41.1 GAD (GENERALIZED ANXIETY DISORDER): ICD-10-CM

## 2019-01-25 DIAGNOSIS — N39.490 OVERFLOW INCONTINENCE OF URINE: ICD-10-CM

## 2019-01-25 DIAGNOSIS — M75.101 RIGHT ROTATOR CUFF TEAR ARTHROPATHY: ICD-10-CM

## 2019-01-25 PROCEDURE — 99214 OFFICE O/P EST MOD 30 MIN: CPT | Performed by: INTERNAL MEDICINE

## 2019-01-25 NOTE — PATIENT INSTRUCTIONS
Upload \"Lose It:\" bre and aim for [id number] maame per day    Try and get more exercise: 30 min a day walking to start    Schedule an MRI of your shoulder    Start the escitalopram-but you can hold it until the cruise is over    Make an appointment with

## 2019-02-22 ENCOUNTER — TELEPHONE (OUTPATIENT)
Dept: INTERNAL MEDICINE CLINIC | Facility: CLINIC | Age: 84
End: 2019-02-22

## 2019-02-22 NOTE — TELEPHONE ENCOUNTER
Patient states that she has been taking escitalopram for a month and she has been experiencing drowsiness and dizziness. Thinks it is from the medication. She is almost out of this. Should she continue the medication?       Dr Aleta Pope stated that she will call

## 2019-02-26 ENCOUNTER — HOSPITAL ENCOUNTER (OUTPATIENT)
Dept: MRI IMAGING | Facility: HOSPITAL | Age: 84
Discharge: HOME | End: 2019-02-26
Attending: INTERNAL MEDICINE
Payer: MEDICARE

## 2019-02-26 DIAGNOSIS — M25.511 ACUTE PAIN OF RIGHT SHOULDER: ICD-10-CM

## 2019-02-26 DIAGNOSIS — M25.611 DECREASED RANGE OF MOTION OF RIGHT SHOULDER: ICD-10-CM

## 2019-02-26 PROCEDURE — 73221 MRI JOINT UPR EXTREM W/O DYE: CPT | Performed by: INTERNAL MEDICINE

## 2019-02-28 ENCOUNTER — TELEPHONE (OUTPATIENT)
Dept: INTERNAL MEDICINE CLINIC | Facility: CLINIC | Age: 84
End: 2019-02-28

## 2019-02-28 RX ORDER — BUPROPION HYDROCHLORIDE 150 MG/1
150 TABLET, EXTENDED RELEASE ORAL DAILY
Qty: 90 TABLET | Refills: 3 | Status: SHIPPED | OUTPATIENT
Start: 2019-02-28 | End: 2019-10-30 | Stop reason: DRUGHIGH

## 2019-02-28 NOTE — TELEPHONE ENCOUNTER
PC to ptSarbjit Tomlinson that Escitalopram is making her sleepy and dizzy. Plan: Stop it and restart Buproprion 150 mg a day. Ordered.

## 2019-04-01 DIAGNOSIS — M54.16 LUMBAR RADICULOPATHY: ICD-10-CM

## 2019-04-01 RX ORDER — TRIAMTERENE AND HYDROCHLOROTHIAZIDE 37.5; 25 MG/1; MG/1
1 CAPSULE ORAL EVERY MORNING
Qty: 90 CAPSULE | Refills: 1 | Status: SHIPPED | OUTPATIENT
Start: 2019-04-01 | End: 2019-10-18

## 2019-04-01 RX ORDER — LOSARTAN POTASSIUM 100 MG/1
50 TABLET ORAL DAILY
Qty: 90 TABLET | Refills: 1 | Status: SHIPPED | OUTPATIENT
Start: 2019-04-01 | End: 2019-04-03 | Stop reason: DRUGHIGH

## 2019-04-01 NOTE — TELEPHONE ENCOUNTER
Losartan Potassium 100mg  Triamterene HCTZ 37.5-25mg    Chart says take one half tab (50mg) Losartan, pharmacy request says take 100mg. Call placed to pt she does not know dosing. Pt said Dr. Twan Ibarra may want to d/c it.

## 2019-04-03 ENCOUNTER — TELEPHONE (OUTPATIENT)
Dept: INTERNAL MEDICINE CLINIC | Facility: CLINIC | Age: 84
End: 2019-04-03

## 2019-04-03 RX ORDER — LOSARTAN POTASSIUM 50 MG/1
1 TABLET ORAL DAILY
COMMUNITY
End: 2020-05-15

## 2019-04-17 ENCOUNTER — OFFICE VISIT (OUTPATIENT)
Dept: INTERNAL MEDICINE CLINIC | Facility: CLINIC | Age: 84
End: 2019-04-17
Payer: MEDICARE

## 2019-04-17 VITALS
BODY MASS INDEX: 31 KG/M2 | WEIGHT: 150.81 LBS | RESPIRATION RATE: 18 BRPM | TEMPERATURE: 97 F | HEART RATE: 68 BPM | OXYGEN SATURATION: 97 % | SYSTOLIC BLOOD PRESSURE: 138 MMHG | DIASTOLIC BLOOD PRESSURE: 64 MMHG

## 2019-04-17 DIAGNOSIS — F32.A DEPRESSION, UNSPECIFIED DEPRESSION TYPE: ICD-10-CM

## 2019-04-17 DIAGNOSIS — F41.1 GAD (GENERALIZED ANXIETY DISORDER): ICD-10-CM

## 2019-04-17 DIAGNOSIS — M25.511 CHRONIC RIGHT SHOULDER PAIN: ICD-10-CM

## 2019-04-17 DIAGNOSIS — R53.83 FATIGUE, UNSPECIFIED TYPE: Primary | ICD-10-CM

## 2019-04-17 DIAGNOSIS — G89.29 CHRONIC RIGHT SHOULDER PAIN: ICD-10-CM

## 2019-04-17 DIAGNOSIS — M12.811 RIGHT ROTATOR CUFF TEAR ARTHROPATHY: ICD-10-CM

## 2019-04-17 DIAGNOSIS — M75.101 RIGHT ROTATOR CUFF TEAR ARTHROPATHY: ICD-10-CM

## 2019-04-17 DIAGNOSIS — I10 BENIGN HYPERTENSION: ICD-10-CM

## 2019-04-17 PROBLEM — N18.30 STAGE 3 CHRONIC KIDNEY DISEASE (HCC): Status: ACTIVE | Noted: 2019-04-17

## 2019-04-17 PROCEDURE — 99214 OFFICE O/P EST MOD 30 MIN: CPT | Performed by: INTERNAL MEDICINE

## 2019-04-17 NOTE — PATIENT INSTRUCTIONS
Hold the dicyclomine and observe sleepiness. A nap may be normal for you at this age. Take Buproprion tw tablets every morning for two weeks and observe if you are feeling less tired.   Felice us for new prescription if you feel it is working      [place] [employer]

## 2019-04-17 NOTE — PROGRESS NOTES
Patient presents with:  Dizziness: all the time feeling. Denies any headaches. can this be due to the bupropion? Fatigue: sleeps all the time.  Has no energy at all       HPI: Sunny Dixon is here for \"dizziness,\" sleepiness, right shoulder pain    Right shou INDUCED BY LIPITOR   • High blood pressure    • High cholesterol    • History of diverticulitis 7/1/2016   • HTN (hypertension)    • Hypercholesterolemia 9/1/2016   • Lumbar radiculopathy 4/18/2016   • Osteoarthritis    • PONV (postoperative nausea and Father          age 80   • Heart Disorder Mother         CHF   • Cancer Mother    • Cancer Maternal Grandmother    • Cancer Maternal Grandfather         leukemia   • Cancer Sister          age [de-identified]   • [de-identified] Brother         brain age 80   • Other (O

## 2019-04-18 ENCOUNTER — NURSE ONLY (OUTPATIENT)
Dept: INTERNAL MEDICINE CLINIC | Facility: CLINIC | Age: 84
End: 2019-04-18
Payer: MEDICARE

## 2019-04-18 DIAGNOSIS — R53.83 FATIGUE, UNSPECIFIED TYPE: ICD-10-CM

## 2019-04-18 PROCEDURE — 80053 COMPREHEN METABOLIC PANEL: CPT | Performed by: INTERNAL MEDICINE

## 2019-04-18 PROCEDURE — 36415 COLL VENOUS BLD VENIPUNCTURE: CPT | Performed by: INTERNAL MEDICINE

## 2019-04-18 PROCEDURE — 84443 ASSAY THYROID STIM HORMONE: CPT | Performed by: INTERNAL MEDICINE

## 2019-04-18 PROCEDURE — 85027 COMPLETE CBC AUTOMATED: CPT | Performed by: INTERNAL MEDICINE

## 2019-04-18 PROCEDURE — 82607 VITAMIN B-12: CPT | Performed by: INTERNAL MEDICINE

## 2019-04-19 ENCOUNTER — TELEPHONE (OUTPATIENT)
Dept: INTERNAL MEDICINE CLINIC | Facility: CLINIC | Age: 84
End: 2019-04-19

## 2019-05-06 RX ORDER — BUPROPION HYDROCHLORIDE 150 MG/1
150 TABLET, EXTENDED RELEASE ORAL 2 TIMES DAILY
Qty: 60 TABLET | Refills: 3 | Status: CANCELLED | OUTPATIENT
Start: 2019-05-06

## 2019-05-06 RX ORDER — BUPROPION HYDROCHLORIDE 300 MG/1
300 TABLET ORAL DAILY
Qty: 90 TABLET | Refills: 3 | Status: SHIPPED | OUTPATIENT
Start: 2019-05-06 | End: 2020-01-01

## 2019-05-06 NOTE — TELEPHONE ENCOUNTER
Since she is taking 300 mg a day, I will change her Rx to Wellbutrin  mg a day. This should be taken in the morning, not evening as it is stimulating. F/U as instructed in last OV.       Toby Ramey MD    Patient states she has been taking Brupropian 1

## 2019-05-22 ENCOUNTER — OFFICE VISIT (OUTPATIENT)
Dept: INTERNAL MEDICINE CLINIC | Facility: CLINIC | Age: 84
End: 2019-05-22
Payer: MEDICARE

## 2019-05-22 VITALS
BODY MASS INDEX: 30 KG/M2 | HEART RATE: 77 BPM | RESPIRATION RATE: 18 BRPM | WEIGHT: 147 LBS | OXYGEN SATURATION: 94 % | DIASTOLIC BLOOD PRESSURE: 90 MMHG | TEMPERATURE: 97 F | SYSTOLIC BLOOD PRESSURE: 148 MMHG

## 2019-05-22 DIAGNOSIS — F41.1 GAD (GENERALIZED ANXIETY DISORDER): ICD-10-CM

## 2019-05-22 DIAGNOSIS — R53.83 OTHER FATIGUE: ICD-10-CM

## 2019-05-22 DIAGNOSIS — M47.816 SPONDYLOSIS OF LUMBAR REGION WITHOUT MYELOPATHY OR RADICULOPATHY: ICD-10-CM

## 2019-05-22 PROCEDURE — 99213 OFFICE O/P EST LOW 20 MIN: CPT | Performed by: INTERNAL MEDICINE

## 2019-05-22 NOTE — PROGRESS NOTES
Patient presents with:  Fatigue: 1mo follow up  Other: patient found out on Monday that her son had a massive heart attack and is on life support       HPI: Stefani Vieira is here to discuss fatigue and low back pain with recent MRI.   However, son, 64years old, pain     LULA (generalized anxiety disorder)     Constipation     Right rotator cuff tear arthropathy     Urge incontinence     Stage 3 chronic kidney disease (HCC)     Depression     Other fatigue        Current Outpatient Medications:  buPROPion HCl ER, X CHF         Physical Exam: (2)  /90 (BP Location: Right arm, Patient Position: Sitting, Cuff Size: adult)   Pulse 77   Temp 97.1 °F (36.2 °C) (Tympanic)   Resp 18   Wt 147 lb   SpO2 94%   BMI 30.32 kg/m²   Alert, crying softly.       Assessment and Pl

## 2019-05-28 NOTE — LETTER
September 13, 2018    [address] [facility] [address]      Dear Love Lizarraga: The following are the results of your recent tests. Please review the list of test results.   Your result is the value on the left; we have also s
no discharge, no irritation, no pain, no redness, and no visual changes.

## 2019-06-06 DIAGNOSIS — E78.00 HYPERCHOLESTEROLEMIA: ICD-10-CM

## 2019-06-07 ENCOUNTER — TELEPHONE (OUTPATIENT)
Dept: INTERNAL MEDICINE CLINIC | Facility: CLINIC | Age: 84
End: 2019-06-07

## 2019-06-07 RX ORDER — PRAVASTATIN SODIUM 10 MG
10 TABLET ORAL NIGHTLY
Qty: 90 TABLET | Refills: 3 | Status: SHIPPED | OUTPATIENT
Start: 2019-06-07 | End: 2020-05-19

## 2019-06-07 NOTE — TELEPHONE ENCOUNTER
PC to pt:  Expressed condolences that 63-year old son  suddenly of acute MI. Out of hospital MI, anoxic for 25, put on ventilator. Found to be brain dead.  18 hours after being taken off ventilator.       Norma Jay is having trouble dealing with th

## 2019-06-07 NOTE — TELEPHONE ENCOUNTER
Would like to know if you know a good grief counselor. She stated that she really needs to talk to a qualified person about what is going on.  She lost her son just recently from a heart attack and she is really depressed

## 2019-07-02 NOTE — TELEPHONE ENCOUNTER
Refill refill fax from [facility] pharmacy for dicyclomine 10 mg, #90, 1 po qhs. Future appt:     Your appointments     Date & Time Appointment Department [facility])    Jan 06, 2020  2:00 PM CST EXAM-ESTABLISHED with Anderson Chow MD [facility] DERMATOLOGY -- [facility]

## 2019-07-03 RX ORDER — DICYCLOMINE HYDROCHLORIDE 10 MG/1
10 CAPSULE ORAL DAILY
Qty: 90 CAPSULE | Refills: 3 | Status: SHIPPED | OUTPATIENT
Start: 2019-07-03 | End: 2020-01-01

## 2019-07-08 RX ORDER — DICYCLOMINE HYDROCHLORIDE 10 MG/1
10 CAPSULE ORAL DAILY
Qty: 90 CAPSULE | Refills: 3 | Status: CANCELLED | OUTPATIENT
Start: 2019-07-08

## 2019-07-08 NOTE — TELEPHONE ENCOUNTER
Dr. Tj Rocha, this is a duplicate. You processed this request on July 3 and I don't know how to cancel it.     Vj Franco

## 2019-07-23 ENCOUNTER — OFFICE VISIT (OUTPATIENT)
Dept: INTERNAL MEDICINE CLINIC | Facility: CLINIC | Age: 84
End: 2019-07-23
Payer: MEDICARE

## 2019-07-23 VITALS
HEART RATE: 89 BPM | WEIGHT: 147 LBS | OXYGEN SATURATION: 97 % | RESPIRATION RATE: 16 BRPM | SYSTOLIC BLOOD PRESSURE: 104 MMHG | DIASTOLIC BLOOD PRESSURE: 66 MMHG | BODY MASS INDEX: 30 KG/M2

## 2019-07-23 DIAGNOSIS — N30.01 ACUTE CYSTITIS WITH HEMATURIA: Primary | ICD-10-CM

## 2019-07-23 LAB
MULTISTIX LOT#: ABNORMAL NUMERIC
PH, URINE: 5.5 (ref 4.5–8)
PROTEIN (URINE DIPSTICK): 30 MG/DL
SPECIFIC GRAVITY: >1.03 (ref 1–1.03)
URINE-COLOR: YELLOW
UROBILINOGEN,SEMI-QN: 0.2 MG/DL (ref 0–1.9)

## 2019-07-23 PROCEDURE — 87077 CULTURE AEROBIC IDENTIFY: CPT | Performed by: INTERNAL MEDICINE

## 2019-07-23 PROCEDURE — 87186 SC STD MICRODIL/AGAR DIL: CPT | Performed by: INTERNAL MEDICINE

## 2019-07-23 PROCEDURE — 87086 URINE CULTURE/COLONY COUNT: CPT | Performed by: INTERNAL MEDICINE

## 2019-07-23 PROCEDURE — 81003 URINALYSIS AUTO W/O SCOPE: CPT | Performed by: INTERNAL MEDICINE

## 2019-07-23 PROCEDURE — 99213 OFFICE O/P EST LOW 20 MIN: CPT | Performed by: INTERNAL MEDICINE

## 2019-07-23 RX ORDER — CIPROFLOXACIN 250 MG/1
250 TABLET, FILM COATED ORAL 2 TIMES DAILY
Qty: 14 TABLET | Refills: 0 | Status: SHIPPED | OUTPATIENT
Start: 2019-07-23 | End: 2019-07-31

## 2019-07-23 NOTE — PROGRESS NOTES
For last 24 hours the right is been complaining of frequent urination no burning at this time. No malodor or discharge. No fever chills or night sweats. Physical examination pleasant individual no acute distress.   Urine dip shows small amount of blood

## 2019-07-31 ENCOUNTER — OFFICE VISIT (OUTPATIENT)
Dept: INTERNAL MEDICINE CLINIC | Facility: CLINIC | Age: 84
End: 2019-07-31
Payer: MEDICARE

## 2019-07-31 VITALS
HEIGHT: 59.5 IN | BODY MASS INDEX: 29.2 KG/M2 | RESPIRATION RATE: 18 BRPM | DIASTOLIC BLOOD PRESSURE: 68 MMHG | SYSTOLIC BLOOD PRESSURE: 128 MMHG | OXYGEN SATURATION: 95 % | HEART RATE: 76 BPM | WEIGHT: 146.81 LBS | TEMPERATURE: 99 F

## 2019-07-31 DIAGNOSIS — I10 BENIGN HYPERTENSION: ICD-10-CM

## 2019-07-31 DIAGNOSIS — M25.511 ACUTE PAIN OF RIGHT SHOULDER: Primary | ICD-10-CM

## 2019-07-31 DIAGNOSIS — M75.82 ROTATOR CUFF TENDINITIS, LEFT: ICD-10-CM

## 2019-07-31 PROCEDURE — 99213 OFFICE O/P EST LOW 20 MIN: CPT | Performed by: INTERNAL MEDICINE

## 2019-07-31 NOTE — PATIENT INSTRUCTIONS
Take tylenol 650 or 1000 mg three times a day. Await call from [address] [place] [address] to start physical therapy.

## 2019-07-31 NOTE — PROGRESS NOTES
Patient presents with:  Shoulder Pain: left shoulder. started yesterday can not rasise her arm above her head       HPI: Genny Murillo is here with acute onset of left shoulder/humeral area pain. She cannot abduct her right arm more than about 75 degrees.  Pain disorder     DAMAGE D/T PRE-ECLAMPSIA  62 YRS AGO   • Visual impairment     GLASSES       Patient Active Problem List:     Idiopathic scoliosis of lumbar spine     Hypercholesterolemia     Benign hypertension     Upper abdominal pain     LULA (generalized a Physical Exam:   /68 (BP Location: Right arm, Patient Position: Sitting, Cuff Size: adult)   Pulse 76   Temp 98.7 °F (37.1 °C) (Oral)   Resp 18   Ht 59.5\"   Wt 146 lb 12.8 oz   SpO2 95%   BMI 29.15 kg/m²   Alert, in no distress  Lungs: Clear

## 2019-08-01 PROBLEM — M25.511 ACUTE PAIN OF RIGHT SHOULDER: Status: ACTIVE | Noted: 2019-08-01

## 2019-10-09 ENCOUNTER — IMMUNIZATION (OUTPATIENT)
Dept: INTERNAL MEDICINE CLINIC | Facility: CLINIC | Age: 84
End: 2019-10-09
Payer: MEDICARE

## 2019-10-09 DIAGNOSIS — Z23 NEED FOR VACCINATION: ICD-10-CM

## 2019-10-09 PROCEDURE — G0008 ADMIN INFLUENZA VIRUS VAC: HCPCS | Performed by: INTERNAL MEDICINE

## 2019-10-09 PROCEDURE — 90662 IIV NO PRSV INCREASED AG IM: CPT | Performed by: INTERNAL MEDICINE

## 2019-10-18 ENCOUNTER — TELEPHONE (OUTPATIENT)
Dept: INTERNAL MEDICINE CLINIC | Facility: CLINIC | Age: 84
End: 2019-10-18

## 2019-10-18 DIAGNOSIS — M54.16 LUMBAR RADICULOPATHY: ICD-10-CM

## 2019-10-18 RX ORDER — TRIAMTERENE AND HYDROCHLOROTHIAZIDE 37.5; 25 MG/1; MG/1
1 CAPSULE ORAL EVERY MORNING
Qty: 90 CAPSULE | Refills: 1 | Status: SHIPPED | OUTPATIENT
Start: 2019-10-18 | End: 2020-03-06

## 2019-10-30 ENCOUNTER — OFFICE VISIT (OUTPATIENT)
Dept: INTERNAL MEDICINE CLINIC | Facility: CLINIC | Age: 84
End: 2019-10-30
Payer: MEDICARE

## 2019-10-30 VITALS
BODY MASS INDEX: 27.47 KG/M2 | TEMPERATURE: 98 F | WEIGHT: 147.38 LBS | HEIGHT: 61.5 IN | HEART RATE: 72 BPM | OXYGEN SATURATION: 98 % | RESPIRATION RATE: 18 BRPM | SYSTOLIC BLOOD PRESSURE: 124 MMHG | DIASTOLIC BLOOD PRESSURE: 62 MMHG

## 2019-10-30 DIAGNOSIS — Z01.818 PRE-OP EXAM: Primary | ICD-10-CM

## 2019-10-30 DIAGNOSIS — H25.9 SENILE CATARACT OF LEFT EYE, UNSPECIFIED AGE-RELATED CATARACT TYPE: ICD-10-CM

## 2019-10-30 DIAGNOSIS — F32.A DEPRESSION, UNSPECIFIED DEPRESSION TYPE: ICD-10-CM

## 2019-10-30 DIAGNOSIS — I10 BENIGN HYPERTENSION: ICD-10-CM

## 2019-10-30 PROCEDURE — 99214 OFFICE O/P EST MOD 30 MIN: CPT | Performed by: INTERNAL MEDICINE

## 2019-10-30 RX ORDER — RANITIDINE 150 MG/1
150 TABLET ORAL NIGHTLY
COMMUNITY
End: 2019-11-05

## 2019-10-30 NOTE — PROGRESS NOTES
Patient presents with:  Pre-Op Exam: patient is having cataract surgery on 11/04/2019.  patient needs clearance      HPI:  Thank you for asking me to see Jennifer Simon, who is here for pre-op clearance for left cataract extraction on November 4th, 2019 wi shoulder     Age-related cataract of left eye      Past Medical History:   Diagnosis Date   • Abdominal aortic atherosclerosis ([facility] [address] 9/1/2016   • Abdominal aortic atherosclerosis ([facility] [address] 9/1/2016   • Abnormal EKG 9/1/2016   • Allergic rhinitis    • Anxiety s Grandfather         leukemia   • Cancer Sister          age [de-identified]   • [de-identified] Brother         brain age 80   • Other (Other[other]) Sister         CHF       raNITIdine HCl 150 MG Oral Tab, Take 150 mg by mouth nightly., Disp: , Rfl:   Triamterene-HCTZ 37. 5 rate, regular rhythm and intact distal pulses. No murmur, rubs or gallops. Pulmonary/Chest: Breath sounds normal. No wheezes, rhonchi or rales  Abdominal: Soft. Bowel sounds are normal. Nontender, no masses, no organomegaly or hernias.   Musculoskeletal:

## 2019-10-31 ENCOUNTER — TELEPHONE (OUTPATIENT)
Dept: INTERNAL MEDICINE CLINIC | Facility: CLINIC | Age: 84
End: 2019-10-31

## 2019-10-31 PROBLEM — H25.9 AGE-RELATED CATARACT OF LEFT EYE: Status: ACTIVE | Noted: 2019-10-31

## 2019-10-31 NOTE — TELEPHONE ENCOUNTER
Please complete note from 10/30/19 clearing patient for surgery.     Patient's surgery is on 11/4/19    Please fax office visit note to [facility] [phone]

## 2019-11-05 RX ORDER — RANITIDINE 150 MG/1
150 TABLET ORAL NIGHTLY
Qty: 90 TABLET | Refills: 3 | Status: SHIPPED | OUTPATIENT
Start: 2019-11-05 | End: 2019-11-13

## 2019-11-13 RX ORDER — FAMOTIDINE 20 MG/1
20 TABLET ORAL 2 TIMES DAILY
Qty: 90 TABLET | Refills: 0 | Status: SHIPPED | OUTPATIENT
Start: 2019-11-13 | End: 2020-02-23

## 2020-01-01 ENCOUNTER — TELEPHONE (OUTPATIENT)
Dept: INTERNAL MEDICINE CLINIC | Facility: CLINIC | Age: 85
End: 2020-01-01

## 2020-01-01 ENCOUNTER — NURSE ONLY (OUTPATIENT)
Dept: INTERNAL MEDICINE CLINIC | Facility: CLINIC | Age: 85
End: 2020-01-01
Payer: MEDICARE

## 2020-01-01 ENCOUNTER — VIRTUAL PHONE E/M (OUTPATIENT)
Dept: INTERNAL MEDICINE CLINIC | Facility: CLINIC | Age: 85
End: 2020-01-01
Payer: MEDICARE

## 2020-01-01 VITALS — SYSTOLIC BLOOD PRESSURE: 120 MMHG | DIASTOLIC BLOOD PRESSURE: 68 MMHG

## 2020-01-01 VITALS — TEMPERATURE: 97 F

## 2020-01-01 DIAGNOSIS — R42 DIZZINESSES: Primary | ICD-10-CM

## 2020-01-01 DIAGNOSIS — F41.1 GENERALIZED ANXIETY DISORDER: ICD-10-CM

## 2020-01-01 DIAGNOSIS — R26.9 GAIT DIFFICULTY: ICD-10-CM

## 2020-01-01 DIAGNOSIS — R35.0 FREQUENCY OF URINATION: Primary | ICD-10-CM

## 2020-01-01 DIAGNOSIS — N18.31 STAGE 3A CHRONIC KIDNEY DISEASE (HCC): ICD-10-CM

## 2020-01-01 DIAGNOSIS — I10 BENIGN HYPERTENSION: ICD-10-CM

## 2020-01-01 DIAGNOSIS — R35.0 FREQUENT URINATION: Primary | ICD-10-CM

## 2020-01-01 PROCEDURE — 85027 COMPLETE CBC AUTOMATED: CPT | Performed by: INTERNAL MEDICINE

## 2020-01-01 PROCEDURE — 87086 URINE CULTURE/COLONY COUNT: CPT | Performed by: INTERNAL MEDICINE

## 2020-01-01 PROCEDURE — 81003 URINALYSIS AUTO W/O SCOPE: CPT | Performed by: INTERNAL MEDICINE

## 2020-01-01 PROCEDURE — 84100 ASSAY OF PHOSPHORUS: CPT | Performed by: INTERNAL MEDICINE

## 2020-01-01 PROCEDURE — 83735 ASSAY OF MAGNESIUM: CPT | Performed by: INTERNAL MEDICINE

## 2020-01-01 PROCEDURE — 80053 COMPREHEN METABOLIC PANEL: CPT | Performed by: INTERNAL MEDICINE

## 2020-01-01 PROCEDURE — 99443 PHONE E/M BY PHYS 21-30 MIN: CPT | Performed by: INTERNAL MEDICINE

## 2020-01-01 RX ORDER — NITROFURANTOIN 25; 75 MG/1; MG/1
100 CAPSULE ORAL 2 TIMES DAILY
Qty: 14 CAPSULE | Refills: 0 | Status: SHIPPED | OUTPATIENT
Start: 2020-01-01 | End: 2021-01-01

## 2020-01-01 RX ORDER — BUPROPION HYDROCHLORIDE 300 MG/1
300 TABLET ORAL DAILY
Qty: 90 TABLET | Refills: 3 | Status: SHIPPED | OUTPATIENT
Start: 2020-01-01 | End: 2021-01-01

## 2020-01-01 RX ORDER — FAMOTIDINE 20 MG/1
TABLET ORAL
Qty: 180 TABLET | Refills: 1 | Status: SHIPPED | OUTPATIENT
Start: 2020-01-01 | End: 2021-01-01

## 2020-01-01 RX ORDER — DICYCLOMINE HYDROCHLORIDE 10 MG/1
CAPSULE ORAL
Qty: 90 CAPSULE | Refills: 3 | Status: ON HOLD | OUTPATIENT
Start: 2020-01-01 | End: 2021-01-01

## 2020-01-01 RX ORDER — PHENAZOPYRIDINE HYDROCHLORIDE 200 MG/1
TABLET, FILM COATED ORAL
Qty: 2 TABLET | Refills: 0 | Status: SHIPPED | OUTPATIENT
Start: 2020-01-01 | End: 2021-01-01

## 2020-01-15 ENCOUNTER — OFFICE VISIT (OUTPATIENT)
Dept: INTERNAL MEDICINE CLINIC | Facility: CLINIC | Age: 85
End: 2020-01-15
Payer: MEDICARE

## 2020-01-15 VITALS
RESPIRATION RATE: 18 BRPM | HEIGHT: 61.5 IN | OXYGEN SATURATION: 98 % | WEIGHT: 149.13 LBS | HEART RATE: 82 BPM | BODY MASS INDEX: 27.79 KG/M2 | DIASTOLIC BLOOD PRESSURE: 82 MMHG | TEMPERATURE: 99 F | SYSTOLIC BLOOD PRESSURE: 122 MMHG

## 2020-01-15 DIAGNOSIS — F41.1 GAD (GENERALIZED ANXIETY DISORDER): ICD-10-CM

## 2020-01-15 DIAGNOSIS — M54.2 TENDERNESS OF NECK: Primary | ICD-10-CM

## 2020-01-15 PROBLEM — N18.4 ANEMIA IN STAGE 4 CHRONIC KIDNEY DISEASE (HCC): Status: ACTIVE | Noted: 2020-01-15

## 2020-01-15 PROBLEM — H40.10X0 OPEN-ANGLE GLAUCOMA: Status: ACTIVE | Noted: 2020-01-15

## 2020-01-15 PROBLEM — D63.1 ANEMIA IN STAGE 4 CHRONIC KIDNEY DISEASE (HCC): Status: ACTIVE | Noted: 2020-01-15

## 2020-01-15 PROCEDURE — 99213 OFFICE O/P EST LOW 20 MIN: CPT | Performed by: INTERNAL MEDICINE

## 2020-01-15 RX ORDER — BRIMONIDINE TARTRATE 0.1 %
DROPS OPHTHALMIC (EYE)
COMMUNITY
Start: 2019-11-29 | End: 2021-01-01

## 2020-01-15 RX ORDER — LATANOPROST 50 UG/ML
SOLUTION/ DROPS OPHTHALMIC
COMMUNITY
Start: 2019-11-20 | End: 2021-01-01

## 2020-01-15 NOTE — PROGRESS NOTES
Patient presents with:  Lymph Node: Pt states pain on right side of neck, swollen lymph node       HPI: Here for swelling and tenderness of right submandibular lymph node for one week and review of therapy with Dr. Jacqueline Hightower.      Swelling and tenderness o diverticulitis 7/1/2016   • HTN (hypertension)    • Hypercholesterolemia 9/1/2016   • Lumbar radiculopathy 4/18/2016   • Osteoarthritis    • PONV (postoperative nausea and vomiting)     MOTION SICKNESS   • Renal disorder     DAMAGE D/T PRE-ECLAMPSIA  58 YR Oral Cap Take 1 capsule (10 mg total) by mouth daily. 90 capsule 3   • Pravastatin Sodium 10 MG Oral Tab Take 1 tablet (10 mg total) by mouth nightly.  90 tablet 3   • buPROPion HCl ER, XL, 300 MG Oral Tablet 24 Hr Take 1 tablet (300 mg total) by mouth isela MD

## 2020-02-12 ENCOUNTER — OFFICE VISIT (OUTPATIENT)
Dept: INTERNAL MEDICINE CLINIC | Facility: CLINIC | Age: 85
End: 2020-02-12
Payer: MEDICARE

## 2020-02-12 VITALS
DIASTOLIC BLOOD PRESSURE: 82 MMHG | HEART RATE: 85 BPM | HEIGHT: 59.39 IN | WEIGHT: 146 LBS | OXYGEN SATURATION: 96 % | TEMPERATURE: 98 F | SYSTOLIC BLOOD PRESSURE: 130 MMHG | BODY MASS INDEX: 29.04 KG/M2 | RESPIRATION RATE: 17 BRPM

## 2020-02-12 DIAGNOSIS — Z85.3 HISTORY OF BREAST CANCER: ICD-10-CM

## 2020-02-12 DIAGNOSIS — M25.511 ACUTE PAIN OF RIGHT SHOULDER: ICD-10-CM

## 2020-02-12 DIAGNOSIS — D63.1 ANEMIA IN STAGE 4 CHRONIC KIDNEY DISEASE (HCC): ICD-10-CM

## 2020-02-12 DIAGNOSIS — I10 BENIGN HYPERTENSION: ICD-10-CM

## 2020-02-12 DIAGNOSIS — R35.89 POLYURIA: ICD-10-CM

## 2020-02-12 DIAGNOSIS — N39.41 URGE INCONTINENCE: ICD-10-CM

## 2020-02-12 DIAGNOSIS — N18.30 STAGE 3 CHRONIC KIDNEY DISEASE (HCC): ICD-10-CM

## 2020-02-12 DIAGNOSIS — M54.2 NECK PAIN ON RIGHT SIDE: ICD-10-CM

## 2020-02-12 DIAGNOSIS — C50.112 MALIGNANT NEOPLASM OF CENTRAL PORTION OF LEFT BREAST IN FEMALE, ESTROGEN RECEPTOR POSITIVE (HCC): ICD-10-CM

## 2020-02-12 DIAGNOSIS — M75.101 RIGHT ROTATOR CUFF TEAR ARTHROPATHY: ICD-10-CM

## 2020-02-12 DIAGNOSIS — D64.9 ANEMIA, UNSPECIFIED TYPE: ICD-10-CM

## 2020-02-12 DIAGNOSIS — Z17.0 MALIGNANT NEOPLASM OF CENTRAL PORTION OF LEFT BREAST IN FEMALE, ESTROGEN RECEPTOR POSITIVE (HCC): ICD-10-CM

## 2020-02-12 DIAGNOSIS — Z98.890 SHOULDER PAIN WITH HISTORY OF REPAIR OF ROTATOR CUFF: ICD-10-CM

## 2020-02-12 DIAGNOSIS — M12.811 RIGHT ROTATOR CUFF TEAR ARTHROPATHY: ICD-10-CM

## 2020-02-12 DIAGNOSIS — E78.00 HYPERCHOLESTEROLEMIA: ICD-10-CM

## 2020-02-12 DIAGNOSIS — Z78.0 POSTMENOPAUSAL STATE: ICD-10-CM

## 2020-02-12 DIAGNOSIS — M85.88 OSTEOPENIA OF SPINE: ICD-10-CM

## 2020-02-12 DIAGNOSIS — Z12.31 ENCOUNTER FOR SCREENING MAMMOGRAM FOR MALIGNANT NEOPLASM OF BREAST: ICD-10-CM

## 2020-02-12 DIAGNOSIS — N18.4 ANEMIA IN STAGE 4 CHRONIC KIDNEY DISEASE (HCC): ICD-10-CM

## 2020-02-12 DIAGNOSIS — Z00.00 ENCOUNTER FOR ANNUAL HEALTH EXAMINATION: ICD-10-CM

## 2020-02-12 DIAGNOSIS — Z00.01 ENCOUNTER FOR GENERAL ADULT MEDICAL EXAMINATION WITH ABNORMAL FINDINGS: Primary | ICD-10-CM

## 2020-02-12 DIAGNOSIS — M25.519 SHOULDER PAIN WITH HISTORY OF REPAIR OF ROTATOR CUFF: ICD-10-CM

## 2020-02-12 DIAGNOSIS — F41.1 GENERALIZED ANXIETY DISORDER: ICD-10-CM

## 2020-02-12 LAB
APPEARANCE: CLEAR
MULTISTIX LOT#: NORMAL NUMERIC
PH, URINE: 7 (ref 4.5–8)
SPECIFIC GRAVITY: 1.01 (ref 1–1.03)
URINE-COLOR: YELLOW
UROBILINOGEN,SEMI-QN: 0.2 MG/DL (ref 0–1.9)

## 2020-02-12 PROCEDURE — G0439 PPPS, SUBSEQ VISIT: HCPCS | Performed by: INTERNAL MEDICINE

## 2020-02-12 PROCEDURE — 81003 URINALYSIS AUTO W/O SCOPE: CPT | Performed by: INTERNAL MEDICINE

## 2020-02-12 NOTE — PROGRESS NOTES
HPI:   Lazarus Sykes is a 80year old female who presents for a Subsequent Annual Wellness exam.   Stefani Vieira is here for annual visit. Still has feeling of lump in her right neck. Hurtst when she sleeps on on right side.  No tooth pain, no strain, etc. instructed to get our office a copy of it for scanning into Epic. She does have a POA but we do NOT have it on file in [facility].    The patient has this document but we do not have it in Epic, and patient is instructed to get our office a copy of it for 04/18/2019    HGB 12.2 04/18/2019    .0 04/18/2019        ALLERGIES:   She is allergic to epinephrine; lipitor [atorvastatin]; escitalopram; duloxetine hcl; and ragweed.     CURRENT MEDICATIONS:   ALPHAGAN P 0.1 % Ophthalmic Solution, INSTILL ONE DORITA Hypercholesterolemia (9/1/2016), Lumbar radiculopathy (4/18/2016), Osteoarthritis, PONV (postoperative nausea and vomiting), Renal disorder, and Visual impairment.     She  has a past surgical history that includes hysterectomy; lumpectomy left (Left); appe atraumatic   Eyes:  PERRL, conjunctiva/corneas clear, EOM's intact both eyes   Ears:  Normal TM's and external ear canals, both ears   Nose: Nares normal, septum midline,mucosa normal, no drainage or sinus tenderness   Throat: Lips, mucosa, and tongue norm encounter diagnosis)    (Z78.0) Postmenopausal state  Plan: XR DEXA BONE DENSITOMETRY (CPT=77080)    (N18.3) Stage 3 chronic kidney disease (HCC)  Plan: COMP METABOLIC PANEL (14)    (U52.5) Anemia, unspecified type  Plan: CBC, PLATELET; NO DIFFERENTIAL Physical Exam only, or if medically necessary Electrocardiogram date       Colorectal Cancer Screening      Colonoscopy Screen every 10 years There are no preventive care reminders to display for this patient.  Update [employer] if applicable    Fle metal- TD and TDaP Not covered by Medicare Part B 08/01/2001 This may be covered with your prescription benefits, but Medicare does not cover unless Medically needed    Zoster  Not covered by Medicare Part B No vaccine history found This may be covered wit

## 2020-02-12 NOTE — PATIENT INSTRUCTIONS
I will suggest books for anxiety and a new therapist.      Make an appointnment with the podiatrist and your dentist.    Make an appt with Dr. Suzanna Raines for the botox. Make an appointment for a mammogram now and a bone density in November.      Rachel aleman maame Limited to patients who meet one of the following criteria:   • Men who are 73-68 years old and have smoked more than 100 cigarettes in their lifetime   • Anyone with a family history    Colorectal Cancer Screening  Covered up to Age 76     Colonoscopy Scr preventive care reminders to display for this patient.  Please get this Mammogram regularly   Immunizations      Influenza  Covered Annually Orders placed or performed in visit on 10/09/19   • FLU VACC HIGH DOSE PRSV FREE   Orders placed or performed in vis

## 2020-02-13 ENCOUNTER — TELEPHONE (OUTPATIENT)
Dept: INTERNAL MEDICINE CLINIC | Facility: CLINIC | Age: 85
End: 2020-02-13

## 2020-02-14 NOTE — TELEPHONE ENCOUNTER
No medication at this time except to make sure she is getting 2-3 dairy servings a day or Calcium 1200 mg supplement a day. She has an order to repeat her bone density. Then if it is in the osteoporotic range we will recommend a medication.

## 2020-02-17 ENCOUNTER — NURSE ONLY (OUTPATIENT)
Dept: INTERNAL MEDICINE CLINIC | Facility: CLINIC | Age: 85
End: 2020-02-17
Payer: MEDICARE

## 2020-02-17 ENCOUNTER — TELEPHONE (OUTPATIENT)
Dept: INTERNAL MEDICINE CLINIC | Facility: CLINIC | Age: 85
End: 2020-02-17

## 2020-02-17 DIAGNOSIS — D64.9 ANEMIA, UNSPECIFIED TYPE: ICD-10-CM

## 2020-02-17 DIAGNOSIS — N18.30 STAGE 3 CHRONIC KIDNEY DISEASE (HCC): ICD-10-CM

## 2020-02-17 LAB
ALBUMIN SERPL-MCNC: 3.4 G/DL (ref 3.4–5)
ALBUMIN/GLOB SERPL: 0.9 {RATIO} (ref 1–2)
ALP LIVER SERPL-CCNC: 107 U/L (ref 55–142)
ALT SERPL-CCNC: 21 U/L (ref 13–56)
ANION GAP SERPL CALC-SCNC: 6 MMOL/L (ref 0–18)
AST SERPL-CCNC: 14 U/L (ref 15–37)
BILIRUB SERPL-MCNC: 0.3 MG/DL (ref 0.1–2)
BUN BLD-MCNC: 32 MG/DL (ref 7–18)
BUN/CREAT SERPL: 17.2 (ref 10–20)
CALCIUM BLD-MCNC: 9.8 MG/DL (ref 8.5–10.1)
CHLORIDE SERPL-SCNC: 107 MMOL/L (ref 98–112)
CO2 SERPL-SCNC: 28 MMOL/L (ref 21–32)
CREAT BLD-MCNC: 1.86 MG/DL (ref 0.55–1.02)
DEPRECATED RDW RBC AUTO: 46.3 FL (ref 35.1–46.3)
ERYTHROCYTE [DISTWIDTH] IN BLOOD BY AUTOMATED COUNT: 12.9 % (ref 11–15)
GLOBULIN PLAS-MCNC: 3.7 G/DL (ref 2.8–4.4)
GLUCOSE BLD-MCNC: 97 MG/DL (ref 70–99)
HCT VFR BLD AUTO: 40.8 % (ref 35–48)
HGB BLD-MCNC: 13 G/DL (ref 12–16)
M PROTEIN MFR SERPL ELPH: 7.1 G/DL (ref 6.4–8.2)
MCH RBC QN AUTO: 31.1 PG (ref 26–34)
MCHC RBC AUTO-ENTMCNC: 31.9 G/DL (ref 31–37)
MCV RBC AUTO: 97.6 FL (ref 80–100)
OSMOLALITY SERPL CALC.SUM OF ELEC: 299 MOSM/KG (ref 275–295)
PATIENT FASTING Y/N/NP: YES
PLATELET # BLD AUTO: 247 10(3)UL (ref 150–450)
POTASSIUM SERPL-SCNC: 3.9 MMOL/L (ref 3.5–5.1)
RBC # BLD AUTO: 4.18 X10(6)UL (ref 3.8–5.3)
SODIUM SERPL-SCNC: 141 MMOL/L (ref 136–145)
WBC # BLD AUTO: 5.7 X10(3) UL (ref 4–11)

## 2020-02-17 PROCEDURE — 80053 COMPREHEN METABOLIC PANEL: CPT | Performed by: INTERNAL MEDICINE

## 2020-02-17 PROCEDURE — 85027 COMPLETE CBC AUTOMATED: CPT | Performed by: INTERNAL MEDICINE

## 2020-02-17 NOTE — TELEPHONE ENCOUNTER
Pt stopped in to let you know that the medication MYRBETRIQ cost $324.  States she has 8 Toviaz left, do you want her to take those before seeing Salima Shoulders?

## 2020-02-18 NOTE — TELEPHONE ENCOUNTER
Spoke with pt, informed to not take Toviaz, schedule appt with Frankey [place] and follow her recommendations. Pt states she understood.

## 2020-02-18 NOTE — TELEPHONE ENCOUNTER
sandy Tomlinson annot order Vesicare because she has glaucoma.   Have her wait until she is seen by Dr. Paulino Mullen.

## 2020-02-18 NOTE — TELEPHONE ENCOUNTER
Let's try to order Vesicare 5 mg one a day for two week; If that doesn't help a lot increase to two tablets a day. Maybe that will be more affordable.

## 2020-02-19 DIAGNOSIS — N18.30 STAGE 3 CHRONIC KIDNEY DISEASE (HCC): Primary | ICD-10-CM

## 2020-02-21 ENCOUNTER — HOSPITAL ENCOUNTER (OUTPATIENT)
Dept: CT IMAGING | Facility: HOSPITAL | Age: 85
Discharge: HOME | End: 2020-02-21
Attending: INTERNAL MEDICINE
Payer: MEDICARE

## 2020-02-21 ENCOUNTER — HOSPITAL ENCOUNTER (OUTPATIENT)
Dept: MAMMOGRAPHY | Facility: HOSPITAL | Age: 85
Discharge: HOME | End: 2020-02-21
Attending: INTERNAL MEDICINE
Payer: MEDICARE

## 2020-02-21 DIAGNOSIS — Z85.3 HISTORY OF BREAST CANCER: ICD-10-CM

## 2020-02-21 DIAGNOSIS — M54.2 NECK PAIN ON RIGHT SIDE: ICD-10-CM

## 2020-02-21 DIAGNOSIS — Z12.31 ENCOUNTER FOR SCREENING MAMMOGRAM FOR MALIGNANT NEOPLASM OF BREAST: ICD-10-CM

## 2020-02-21 PROCEDURE — 70490 CT SOFT TISSUE NECK W/O DYE: CPT | Performed by: INTERNAL MEDICINE

## 2020-02-21 PROCEDURE — 77067 SCR MAMMO BI INCL CAD: CPT | Performed by: INTERNAL MEDICINE

## 2020-02-21 PROCEDURE — 77063 BREAST TOMOSYNTHESIS BI: CPT | Performed by: INTERNAL MEDICINE

## 2020-02-23 RX ORDER — FAMOTIDINE 20 MG/1
TABLET ORAL
Qty: 90 TABLET | Refills: 0 | Status: SHIPPED | OUTPATIENT
Start: 2020-02-23 | End: 2020-03-16

## 2020-02-24 ENCOUNTER — TELEPHONE (OUTPATIENT)
Dept: INTERNAL MEDICINE CLINIC | Facility: CLINIC | Age: 85
End: 2020-02-24

## 2020-02-24 RX ORDER — AMOXICILLIN AND CLAVULANATE POTASSIUM 875; 125 MG/1; MG/1
1 TABLET, FILM COATED ORAL 2 TIMES DAILY
Qty: 20 TABLET | Refills: 0 | Status: SHIPPED | OUTPATIENT
Start: 2020-02-24 | End: 2020-03-05

## 2020-02-25 ENCOUNTER — TELEPHONE (OUTPATIENT)
Dept: INTERNAL MEDICINE CLINIC | Facility: CLINIC | Age: 85
End: 2020-02-25

## 2020-02-25 NOTE — TELEPHONE ENCOUNTER
Called pt because per , pt needs to submit previous mammogram pictures to [facility] for comparison.

## 2020-02-25 NOTE — TELEPHONE ENCOUNTER
Spoke with pt, she understands she needs to get previous mammogram images from [facility] for mammogram compares.

## 2020-02-26 ENCOUNTER — OFFICE VISIT (OUTPATIENT)
Dept: INTERNAL MEDICINE CLINIC | Facility: CLINIC | Age: 85
End: 2020-02-26
Payer: MEDICARE

## 2020-02-26 VITALS
OXYGEN SATURATION: 95 % | SYSTOLIC BLOOD PRESSURE: 118 MMHG | WEIGHT: 149.88 LBS | RESPIRATION RATE: 18 BRPM | HEART RATE: 78 BPM | BODY MASS INDEX: 30 KG/M2 | TEMPERATURE: 98 F | DIASTOLIC BLOOD PRESSURE: 76 MMHG

## 2020-02-26 DIAGNOSIS — N18.30 STAGE 3 CHRONIC KIDNEY DISEASE (HCC): ICD-10-CM

## 2020-02-26 DIAGNOSIS — I10 BENIGN HYPERTENSION: Primary | ICD-10-CM

## 2020-02-26 DIAGNOSIS — R42 VERTIGO: ICD-10-CM

## 2020-02-26 PROCEDURE — 99213 OFFICE O/P EST LOW 20 MIN: CPT | Performed by: INTERNAL MEDICINE

## 2020-02-26 RX ORDER — MECLIZINE HCL 12.5 MG/1
TABLET ORAL
Qty: 15 TABLET | Refills: 0 | Status: SHIPPED | OUTPATIENT
Start: 2020-02-26 | End: 2020-03-06

## 2020-02-26 NOTE — PROGRESS NOTES
Patient presents with:  Diarrhea: medication reaction. patient states that the diarrhea started the day after she started the amoxicillin. this AM was normal BM but then watery stools followed.  she started medication Tuesday 02/25/2020       HPI: Maty Yin i impairment     GLASSES       Patient Active Problem List:     Idiopathic scoliosis of lumbar spine     Hypercholesterolemia     Benign hypertension     Constipation     Right rotator cuff tear arthropathy     Urge incontinence     Stage 3 chronic kidney di Dorzolamide HCl-Timolol Mal 22.3-6.8 MG/ML Ophthalmic Solution Place 1 drop into both eyes 2 (two) times daily.          PFHSH:   Family History   Problem Relation Age of Onset   • Heart Disorder Father          age 80   • Heart Disorder Mother

## 2020-02-27 NOTE — PROGRESS NOTES
Patient presents with:  Diarrhea: medication reaction. patient states that the diarrhea started the day after she started the amoxicillin. this AM was normal BM but then watery stools followed.  she started medication Tuesday 02/25/2020       HPI: Doretha Tenorio i Hypercholesterolemia 9/1/2016   • Lumbar radiculopathy 4/18/2016   • Osteoarthritis    • PONV (postoperative nausea and vomiting)     MOTION SICKNESS   • Renal disorder     DAMAGE D/T PRE-ECLAMPSIA  62 YRS AGO   • Visual impairment     GLASSES       Patien mouth daily. 90 capsule 3   • Pravastatin Sodium 10 MG Oral Tab Take 1 tablet (10 mg total) by mouth nightly. 90 tablet 3   • buPROPion HCl ER, XL, 300 MG Oral Tablet 24 Hr Take 1 tablet (300 mg total) by mouth daily.  90 tablet 3   • Losartan Potassium 50

## 2020-03-02 PROBLEM — R42 VERTIGO: Status: ACTIVE | Noted: 2020-03-02

## 2020-03-06 ENCOUNTER — OFFICE VISIT (OUTPATIENT)
Dept: INTERNAL MEDICINE CLINIC | Facility: CLINIC | Age: 85
End: 2020-03-06
Payer: MEDICARE

## 2020-03-06 VITALS
BODY MASS INDEX: 29.72 KG/M2 | SYSTOLIC BLOOD PRESSURE: 118 MMHG | TEMPERATURE: 99 F | HEIGHT: 59.39 IN | DIASTOLIC BLOOD PRESSURE: 78 MMHG | WEIGHT: 149.38 LBS | RESPIRATION RATE: 18 BRPM | OXYGEN SATURATION: 96 % | HEART RATE: 88 BPM

## 2020-03-06 DIAGNOSIS — I95.1 ORTHOSTATIC HYPOTENSION: ICD-10-CM

## 2020-03-06 DIAGNOSIS — I10 BENIGN HYPERTENSION: ICD-10-CM

## 2020-03-06 DIAGNOSIS — R42 LIGHTHEADEDNESS: Primary | ICD-10-CM

## 2020-03-06 PROCEDURE — 99214 OFFICE O/P EST MOD 30 MIN: CPT | Performed by: INTERNAL MEDICINE

## 2020-03-06 NOTE — PATIENT INSTRUCTIONS
Drink 6-8 8oz of fluids per day    Stop the Triamterene    Get blood test next Thursday nonfasting    Don't take the Meclizine or do exercises.

## 2020-03-06 NOTE — PROGRESS NOTES
CC; F/U lightheadedness when looking up, bending down     HPI: Sunny Dixon is here for follow up of light-headedness. It is not any better. She did not take the Meclizine or do the exercises. It only occurs when she bends down or looks up.       Low BP and hi disorder     DAMAGE D/T PRE-ECLAMPSIA  62 YRS AGO   • Right rotator cuff tear arthropathy 8/1/2018   • Visual impairment     GLASSES       Patient Active Problem List:     Idiopathic scoliosis of lumbar spine     Hypercholesterolemia     Benign hypertensio Heart Disorder Mother         CHF   • Cancer Mother    • Cancer Maternal Grandmother    • Cancer Maternal Grandfather         leukemia   • Cancer Sister          age [de-identified]   • [de-identified] Brother         brain age 80   • Other (Other) Sister         CHF   • Br

## 2020-03-11 ENCOUNTER — OFFICE VISIT (OUTPATIENT)
Dept: INTERNAL MEDICINE CLINIC | Facility: CLINIC | Age: 85
End: 2020-03-11
Payer: MEDICARE

## 2020-03-11 ENCOUNTER — NURSE ONLY (OUTPATIENT)
Dept: INTERNAL MEDICINE CLINIC | Facility: CLINIC | Age: 85
End: 2020-03-11
Payer: MEDICARE

## 2020-03-11 VITALS
HEART RATE: 94 BPM | WEIGHT: 155.31 LBS | SYSTOLIC BLOOD PRESSURE: 122 MMHG | HEIGHT: 59.39 IN | BODY MASS INDEX: 30.9 KG/M2 | OXYGEN SATURATION: 96 % | RESPIRATION RATE: 18 BRPM | TEMPERATURE: 99 F | DIASTOLIC BLOOD PRESSURE: 80 MMHG

## 2020-03-11 DIAGNOSIS — N18.30 STAGE 3 CHRONIC KIDNEY DISEASE (HCC): ICD-10-CM

## 2020-03-11 DIAGNOSIS — R42 POSITIONAL LIGHTHEADEDNESS: Primary | ICD-10-CM

## 2020-03-11 DIAGNOSIS — I95.1 ORTHOSTATIC HYPOTENSION: ICD-10-CM

## 2020-03-11 DIAGNOSIS — M25.473 ANKLE SWELLING, UNSPECIFIED LATERALITY: ICD-10-CM

## 2020-03-11 LAB
ALBUMIN SERPL-MCNC: 3 G/DL (ref 3.4–5)
ALBUMIN/GLOB SERPL: 1 {RATIO} (ref 1–2)
ALP LIVER SERPL-CCNC: 95 U/L (ref 55–142)
ALT SERPL-CCNC: 19 U/L (ref 13–56)
ANION GAP SERPL CALC-SCNC: 3 MMOL/L (ref 0–18)
AST SERPL-CCNC: 14 U/L (ref 15–37)
BILIRUB SERPL-MCNC: 0.3 MG/DL (ref 0.1–2)
BUN BLD-MCNC: 31 MG/DL (ref 7–18)
BUN/CREAT SERPL: 19.6 (ref 10–20)
CALCIUM BLD-MCNC: 9.6 MG/DL (ref 8.5–10.1)
CHLORIDE SERPL-SCNC: 112 MMOL/L (ref 98–112)
CO2 SERPL-SCNC: 27 MMOL/L (ref 21–32)
CREAT BLD-MCNC: 1.58 MG/DL (ref 0.55–1.02)
DEPRECATED RDW RBC AUTO: 44.6 FL (ref 35.1–46.3)
ERYTHROCYTE [DISTWIDTH] IN BLOOD BY AUTOMATED COUNT: 12.6 % (ref 11–15)
GLOBULIN PLAS-MCNC: 3.1 G/DL (ref 2.8–4.4)
GLUCOSE BLD-MCNC: 97 MG/DL (ref 70–99)
HAV IGM SER QL: 2.4 MG/DL (ref 1.6–2.6)
HCT VFR BLD AUTO: 38.1 % (ref 35–48)
HGB BLD-MCNC: 12.1 G/DL (ref 12–16)
M PROTEIN MFR SERPL ELPH: 6.1 G/DL (ref 6.4–8.2)
MCH RBC QN AUTO: 30.4 PG (ref 26–34)
MCHC RBC AUTO-ENTMCNC: 31.8 G/DL (ref 31–37)
MCV RBC AUTO: 95.7 FL (ref 80–100)
OSMOLALITY SERPL CALC.SUM OF ELEC: 300 MOSM/KG (ref 275–295)
PATIENT FASTING Y/N/NP: YES
PHOSPHATE SERPL-MCNC: 2.6 MG/DL (ref 2.5–4.9)
PLATELET # BLD AUTO: 232 10(3)UL (ref 150–450)
POTASSIUM SERPL-SCNC: 4.1 MMOL/L (ref 3.5–5.1)
RBC # BLD AUTO: 3.98 X10(6)UL (ref 3.8–5.3)
SODIUM SERPL-SCNC: 142 MMOL/L (ref 136–145)
WBC # BLD AUTO: 4.9 X10(3) UL (ref 4–11)

## 2020-03-11 PROCEDURE — 84100 ASSAY OF PHOSPHORUS: CPT | Performed by: INTERNAL MEDICINE

## 2020-03-11 PROCEDURE — 99214 OFFICE O/P EST MOD 30 MIN: CPT | Performed by: INTERNAL MEDICINE

## 2020-03-11 PROCEDURE — 83735 ASSAY OF MAGNESIUM: CPT | Performed by: INTERNAL MEDICINE

## 2020-03-11 PROCEDURE — 80053 COMPREHEN METABOLIC PANEL: CPT | Performed by: INTERNAL MEDICINE

## 2020-03-11 PROCEDURE — 85027 COMPLETE CBC AUTOMATED: CPT | Performed by: INTERNAL MEDICINE

## 2020-03-11 RX ORDER — HYDROCHLOROTHIAZIDE 12.5 MG/1
12.5 TABLET ORAL DAILY
Qty: 90 TABLET | Refills: 3 | Status: ON HOLD | OUTPATIENT
Start: 2020-03-11 | End: 2021-01-01

## 2020-03-11 NOTE — PROGRESS NOTES
Patient presents with:  Sinusitis: follow up, seems better  Swelling: both ankles since stopping the diaretic       HPI: F/U for dizziness, orthostatic hypotension off HCTZ now with recurrent swelling of ankles    Adrien Tuttle is here after one week off HCTZ.  Alex Brannon SICKNESS   • Renal disorder     DAMAGE D/T PRE-ECLAMPSIA  62 YRS AGO   • Right rotator cuff tear arthropathy 8/1/2018   • Visual impairment     GLASSES       Patient Active Problem List:     Idiopathic scoliosis of lumbar spine     Hypercholesterolemia (Patient not taking: Reported on 3/11/2020 ) 20 g 2       PFHSH:   Family History   Problem Relation Age of Onset   • Heart Disorder Father          age 80   • Heart Disorder Mother         CHF   • Cancer Mother    • Cancer Maternal Grandmother    • Ca

## 2020-03-12 NOTE — PATIENT INSTRUCTIONS
Start HCTZ 12.5 mg one a day in the morning    Continue to hydrate 6-8 gl of fluids per day    Elevate your ankles when you are sitting    Follow up in one month.

## 2020-03-16 RX ORDER — FAMOTIDINE 20 MG/1
TABLET ORAL
Qty: 90 TABLET | Refills: 0 | Status: SHIPPED | OUTPATIENT
Start: 2020-03-16 | End: 2020-04-24

## 2020-04-24 RX ORDER — FAMOTIDINE 20 MG/1
TABLET ORAL
Qty: 90 TABLET | Refills: 3 | Status: SHIPPED | OUTPATIENT
Start: 2020-04-24 | End: 2020-01-01

## 2020-05-15 ENCOUNTER — TELEPHONE (OUTPATIENT)
Dept: INTERNAL MEDICINE CLINIC | Facility: CLINIC | Age: 85
End: 2020-05-15

## 2020-05-15 RX ORDER — LOSARTAN POTASSIUM 50 MG/1
50 TABLET ORAL DAILY
Qty: 90 TABLET | Refills: 3 | Status: SHIPPED | OUTPATIENT
Start: 2020-05-15

## 2020-05-19 DIAGNOSIS — E78.00 HYPERCHOLESTEROLEMIA: ICD-10-CM

## 2020-05-19 RX ORDER — PRAVASTATIN SODIUM 10 MG
TABLET ORAL
Qty: 90 TABLET | Refills: 3 | Status: SHIPPED | OUTPATIENT
Start: 2020-05-19 | End: 2021-01-01

## 2020-06-03 ENCOUNTER — OFFICE VISIT (OUTPATIENT)
Dept: INTERNAL MEDICINE CLINIC | Facility: CLINIC | Age: 85
End: 2020-06-03
Payer: MEDICARE

## 2020-06-03 VITALS
DIASTOLIC BLOOD PRESSURE: 80 MMHG | SYSTOLIC BLOOD PRESSURE: 132 MMHG | HEART RATE: 81 BPM | TEMPERATURE: 99 F | WEIGHT: 149.88 LBS | RESPIRATION RATE: 17 BRPM | OXYGEN SATURATION: 99 % | BODY MASS INDEX: 30 KG/M2

## 2020-06-03 DIAGNOSIS — R14.0 BLOATING: ICD-10-CM

## 2020-06-03 DIAGNOSIS — E07.89 THYROID PAIN: ICD-10-CM

## 2020-06-03 DIAGNOSIS — K82.8 GALLBLADDER SLUDGE: ICD-10-CM

## 2020-06-03 DIAGNOSIS — R53.83 OTHER FATIGUE: ICD-10-CM

## 2020-06-03 DIAGNOSIS — M54.2 NECK PAIN: ICD-10-CM

## 2020-06-03 DIAGNOSIS — R10.84 GENERALIZED ABDOMINAL PAIN: Primary | ICD-10-CM

## 2020-06-03 PROCEDURE — 99214 OFFICE O/P EST MOD 30 MIN: CPT | Performed by: INTERNAL MEDICINE

## 2020-06-03 NOTE — PROGRESS NOTES
Patient presents with:   Other: abdominal pain and bloating  Voice Problem: hoarseness       HPI: Jorge Bell is here with c/o of pain/aching in center of of lower throat, hoarseness and change in voice (unable to sing), fatigue, increased somnolent, chronic ab 60's left lumpectomy w/ radiation   • Cancer ([facility] [address] ~2006    LEFT BREAST LUMPECTOMY /RADIATION    • Depression    • Diverticulitis 2016   • Diverticulitis of colon 11/16/2006   • Diverticulitis of large intestine without perforation or abscess without bleed daily. 90 tablet 3   • FAMOTIDINE 20 MG Oral Tab TAKE 1 TABLET BY MOUTH TWICE A DAY 90 tablet 3   • hydrochlorothiazide 12.5 MG Oral Tab Take 1 tablet (12.5 mg total) by mouth daily.  90 tablet 3   • ALPHAGAN P 0.1 % Ophthalmic Solution INSTILL ONE DROP INT for years, with \"normal\" CT of abd in 2018 but sludge in gallbladder  (K82.8) Gallbladder sludge  (R14.0) Bloating  Plan: NM GB HEPATOBILIARY SCAN  (CPT=78226)    (M54.2) Neck pain, centrally over thyroid, r/o thyroiditis  Plan: Labs as ordered    (R53.8

## 2020-06-04 ENCOUNTER — HOSPITAL ENCOUNTER (OUTPATIENT)
Dept: BONE DENSITY | Age: 85
Discharge: HOME | End: 2020-06-04
Attending: INTERNAL MEDICINE
Payer: MEDICARE

## 2020-06-04 DIAGNOSIS — Z78.0 POSTMENOPAUSAL STATE: ICD-10-CM

## 2020-06-04 PROCEDURE — 77080 DXA BONE DENSITY AXIAL: CPT | Performed by: INTERNAL MEDICINE

## 2020-06-04 NOTE — PATIENT INSTRUCTIONS
Schedule the [facility] nuclear medicine scan to see if you have a gallbladder that is not working. Get the blood tests that have been ordered. Get your thyroid ultrasound.       Depending on these tests, we will refer you to an Ear, nose and throat doct

## 2020-06-08 ENCOUNTER — NURSE ONLY (OUTPATIENT)
Dept: INTERNAL MEDICINE CLINIC | Facility: CLINIC | Age: 85
End: 2020-06-08
Payer: MEDICARE

## 2020-06-08 DIAGNOSIS — E07.89 THYROID PAIN: ICD-10-CM

## 2020-06-08 PROCEDURE — 86140 C-REACTIVE PROTEIN: CPT | Performed by: INTERNAL MEDICINE

## 2020-06-08 PROCEDURE — 84439 ASSAY OF FREE THYROXINE: CPT | Performed by: INTERNAL MEDICINE

## 2020-06-08 PROCEDURE — 84443 ASSAY THYROID STIM HORMONE: CPT | Performed by: INTERNAL MEDICINE

## 2020-06-08 PROCEDURE — 84480 ASSAY TRIIODOTHYRONINE (T3): CPT | Performed by: INTERNAL MEDICINE

## 2020-06-08 PROCEDURE — 86800 THYROGLOBULIN ANTIBODY: CPT | Performed by: INTERNAL MEDICINE

## 2020-06-08 PROCEDURE — 85652 RBC SED RATE AUTOMATED: CPT | Performed by: INTERNAL MEDICINE

## 2020-06-08 PROCEDURE — 86376 MICROSOMAL ANTIBODY EACH: CPT | Performed by: INTERNAL MEDICINE

## 2020-06-11 ENCOUNTER — HOSPITAL ENCOUNTER (OUTPATIENT)
Dept: ULTRASOUND IMAGING | Facility: HOSPITAL | Age: 85
Discharge: HOME | End: 2020-06-11
Attending: INTERNAL MEDICINE
Payer: MEDICARE

## 2020-06-11 ENCOUNTER — TELEPHONE (OUTPATIENT)
Dept: INTERNAL MEDICINE CLINIC | Facility: CLINIC | Age: 85
End: 2020-06-11

## 2020-06-11 DIAGNOSIS — E04.2 MULTIPLE THYROID NODULES: ICD-10-CM

## 2020-06-11 PROCEDURE — 76536 US EXAM OF HEAD AND NECK: CPT | Performed by: INTERNAL MEDICINE

## 2020-06-11 NOTE — TELEPHONE ENCOUNTER
PC to pt to relay lab results:      Co marked dizziness when standing last evening and veering to the right. Review of chart reveals hx positional dizziness and  orthostatic hypotension in late March 2020 on HCTZ 25 mg per day.   Took pt off HCTZ and d

## 2020-06-12 ENCOUNTER — HOSPITAL ENCOUNTER (OUTPATIENT)
Dept: NUCLEAR MEDICINE | Facility: HOSPITAL | Age: 85
Discharge: HOME | End: 2020-06-12
Attending: INTERNAL MEDICINE
Payer: MEDICARE

## 2020-06-12 ENCOUNTER — OFFICE VISIT (OUTPATIENT)
Dept: INTERNAL MEDICINE CLINIC | Facility: CLINIC | Age: 85
End: 2020-06-12
Payer: MEDICARE

## 2020-06-12 VITALS
SYSTOLIC BLOOD PRESSURE: 130 MMHG | WEIGHT: 148 LBS | BODY MASS INDEX: 30 KG/M2 | TEMPERATURE: 99 F | RESPIRATION RATE: 17 BRPM | DIASTOLIC BLOOD PRESSURE: 80 MMHG | HEART RATE: 80 BPM | OXYGEN SATURATION: 98 %

## 2020-06-12 DIAGNOSIS — N18.30 STAGE 3 CHRONIC KIDNEY DISEASE (HCC): ICD-10-CM

## 2020-06-12 DIAGNOSIS — R10.84 GENERALIZED ABDOMINAL PAIN: ICD-10-CM

## 2020-06-12 DIAGNOSIS — K82.8 GALLBLADDER SLUDGE: ICD-10-CM

## 2020-06-12 DIAGNOSIS — I10 BENIGN HYPERTENSION: ICD-10-CM

## 2020-06-12 DIAGNOSIS — R14.0 BLOATING: ICD-10-CM

## 2020-06-12 DIAGNOSIS — R10.10 PAIN OF UPPER ABDOMEN: ICD-10-CM

## 2020-06-12 DIAGNOSIS — E04.2 MULTINODULAR GOITER: ICD-10-CM

## 2020-06-12 DIAGNOSIS — R49.0 HOARSENESS: ICD-10-CM

## 2020-06-12 DIAGNOSIS — M54.2 NECK PAIN: ICD-10-CM

## 2020-06-12 DIAGNOSIS — R42 POSITIONAL LIGHTHEADEDNESS: Primary | ICD-10-CM

## 2020-06-12 PROCEDURE — 78227 HEPATOBIL SYST IMAGE W/DRUG: CPT | Performed by: INTERNAL MEDICINE

## 2020-06-12 PROCEDURE — 78226 HEPATOBILIARY SYSTEM IMAGING: CPT | Performed by: INTERNAL MEDICINE

## 2020-06-12 PROCEDURE — 99214 OFFICE O/P EST MOD 30 MIN: CPT | Performed by: INTERNAL MEDICINE

## 2020-06-12 NOTE — PATIENT INSTRUCTIONS
Schedule an MRI of your thoracic spine. Make a appointment with Dr. Geri Doran re: hoarseness, central neck discomfort, thyroid nodules (does she need a biopsy?)    Drink a quart of Gatorade a day and then you can start the diuretic.

## 2020-06-12 NOTE — PROGRESS NOTES
Patient presents with:  Test Results: discuss ultrasound results       HPI: Bryan Inman is here for f/u positional lightheadedness, neck discomfort and chronic abdominal pain. Dizziness: It is better today as she did not take her HCTZ yesterday or today. bleeding 10/1/2018   • Diverticulosis of large intestine 8/28/2017   • Dyslipidemia 2016   • Exposure to radiation    • Fecal incontinence     TEMPORARY   • Glaucoma     RIGHT   • Hearing impairment    • Hearing loss    • Hepatitis, unspecified     INDUCED INTO THE RIGHT EYE TWICE DAILY     • latanoprost 0.005 % Ophthalmic Solution INSTILL ONE DROP IN EACH EYE NIGHTLY     • hydrocortisone 2.5 % External Ointment Apply to AA of neck BID for 2 weeks, then decrease to Gagblt-Yuvspjtds-Rzkqvq for 2 weeks, then d work-up negative, rule out thoracic radiculopathy  Plan: MRI SPINE THORACIC (CPT=72146)    (I10) Benign hypertension, controlled  Plan: CPm    (N18.3) Stage 3 chronic kidney disease ([facility] [address] stable but interferes with possible need for Gadolinium with MRI  P

## 2020-06-15 ENCOUNTER — TELEPHONE (OUTPATIENT)
Dept: INTERNAL MEDICINE CLINIC | Facility: CLINIC | Age: 85
End: 2020-06-15

## 2020-06-15 DIAGNOSIS — E88.49: Primary | ICD-10-CM

## 2020-06-15 NOTE — TELEPHONE ENCOUNTER
----- Message from Se Harmon MD sent at 6/12/2020 10:00 PM CDT -----  Good morning,    Please call [employer] and advise her to forget about calling GI for her abdominal pain. I want her to get an MRI of her thoracic spine instead.   Her abdominal pain m

## 2020-06-17 ENCOUNTER — TELEPHONE (OUTPATIENT)
Dept: INTERNAL MEDICINE CLINIC | Facility: CLINIC | Age: 85
End: 2020-06-17

## 2020-06-17 NOTE — TELEPHONE ENCOUNTER
Irvin Cary called and wanted to know if Dr Kary Chiang wanted her to still keep her appointment with Dr Reji Sullivan. Per Dr Kary Chiang yes patient is to keep her appointment.  Call was made to the patient and she verbally understood

## 2020-06-19 ENCOUNTER — TELEPHONE (OUTPATIENT)
Dept: INTERNAL MEDICINE CLINIC | Facility: CLINIC | Age: 85
End: 2020-06-19

## 2020-10-08 NOTE — TELEPHONE ENCOUNTER
Although she does not need a Medical Wellness until Feb 2021 - Dr Silas Vazquez would still like to see her in late November to follow up on her blood pressure and meds

## 2020-11-25 NOTE — PATIENT INSTRUCTIONS
So good to talk with you today, Octavia Tijerina:    Expect a call from the physical therapist to set up your sessions. Make an appointment with our  to get blood work nonfasting some time int he next week or so.     Continue the same medications you ar

## 2020-11-25 NOTE — PROGRESS NOTES
Virtual Telephone Check-In    Cherri Hennessy verbally consents to a Virtual/Telephone Check-In visit on 11/25/20. Patient has been referred to the [facility] website at [email or web address] to review the yearly Consent to Treat document.     Patient underst

## 2020-12-02 NOTE — TELEPHONE ENCOUNTER
Advised that kidney function is slightly weak but it is holding. Phosphorus and Magnesium are normal.      Pt c/o dizziness. Has order for balance PT. She will undertake that and if dizziness continues we will get MRI at that time.     Ericka Castorena MD

## 2020-12-13 NOTE — TELEPHONE ENCOUNTER
PC from pt:  Having urinary frequency q 20 min, small amounts and now burning. No blood, fever, abd pain. Has hx of UTIs but not frequent. Could she get an antibiotic? Will order Macrobid one bid and Pyridium 200 mg two tabs-one now and one in 6 hours.

## 2020-12-14 NOTE — TELEPHONE ENCOUNTER
Left message with  to have the patient come down and give a urine specimen today per Dr. Indira Giles. See note from Dr. Indira Giles yesterday.

## 2020-12-15 NOTE — TELEPHONE ENCOUNTER
Patient informed of recent urine dip result as per Dr. Renae Limon and awaiting urine culture. Patient states she appreciated the call.

## 2020-12-15 NOTE — TELEPHONE ENCOUNTER
----- Message from Ileana Blandon MD sent at 12/14/2020 10:36 PM CST -----  Urinalysis is still positive for nitrites  after starting Macrobid. Will await culture.

## 2020-12-16 NOTE — TELEPHONE ENCOUNTER
----- Message from Ericka Castorena MD sent at 12/15/2020  7:22 PM CST -----  Please let Jorge Pontiff know that the antibiotic she is taking is the right one and she should finish it.

## 2021-01-01 ENCOUNTER — TELEPHONE (OUTPATIENT)
Dept: INTERNAL MEDICINE CLINIC | Facility: CLINIC | Age: 86
End: 2021-01-01

## 2021-01-01 ENCOUNTER — MOBILE ENCOUNTER (OUTPATIENT)
Dept: INTERNAL MEDICINE CLINIC | Facility: CLINIC | Age: 86
End: 2021-01-01

## 2021-01-01 ENCOUNTER — EXTERNAL FACILITY (OUTPATIENT)
Dept: INTERNAL MEDICINE CLINIC | Facility: CLINIC | Age: 86
End: 2021-01-01

## 2021-01-01 ENCOUNTER — PATIENT OUTREACH (OUTPATIENT)
Dept: CASE MANAGEMENT | Age: 86
End: 2021-01-01

## 2021-01-01 ENCOUNTER — TELEPHONE (OUTPATIENT)
Dept: NEUROLOGY | Facility: CLINIC | Age: 86
End: 2021-01-01

## 2021-01-01 ENCOUNTER — APPOINTMENT (OUTPATIENT)
Dept: GENERAL RADIOLOGY | Facility: HOSPITAL | Age: 86
DRG: 098 | End: 2021-01-01
Attending: NURSE PRACTITIONER
Payer: MEDICARE

## 2021-01-01 ENCOUNTER — LAB ENCOUNTER (OUTPATIENT)
Dept: LAB | Age: 86
End: 2021-01-01
Attending: PHYSICIAN ASSISTANT
Payer: COMMERCIAL

## 2021-01-01 ENCOUNTER — OFFICE VISIT (OUTPATIENT)
Dept: NEUROLOGY | Facility: CLINIC | Age: 86
End: 2021-01-01
Payer: MEDICARE

## 2021-01-01 ENCOUNTER — OFFICE VISIT (OUTPATIENT)
Dept: INTERNAL MEDICINE CLINIC | Facility: CLINIC | Age: 86
End: 2021-01-01
Payer: MEDICARE

## 2021-01-01 ENCOUNTER — OFFICE VISIT (OUTPATIENT)
Dept: INTERNAL MEDICINE CLINIC | Facility: CLINIC | Age: 86
End: 2021-01-01

## 2021-01-01 ENCOUNTER — APPOINTMENT (OUTPATIENT)
Dept: CV DIAGNOSTICS | Facility: HOSPITAL | Age: 86
DRG: 098 | End: 2021-01-01
Attending: HOSPITALIST
Payer: MEDICARE

## 2021-01-01 ENCOUNTER — NURSE ONLY (OUTPATIENT)
Dept: INTERNAL MEDICINE CLINIC | Facility: CLINIC | Age: 86
End: 2021-01-01
Payer: MEDICARE

## 2021-01-01 ENCOUNTER — TELEPHONE (OUTPATIENT)
Dept: SURGERY | Facility: CLINIC | Age: 86
End: 2021-01-01

## 2021-01-01 ENCOUNTER — APPOINTMENT (OUTPATIENT)
Dept: CT IMAGING | Facility: HOSPITAL | Age: 86
DRG: 098 | End: 2021-01-01
Attending: EMERGENCY MEDICINE
Payer: MEDICARE

## 2021-01-01 ENCOUNTER — HOSPITAL ENCOUNTER (OUTPATIENT)
Dept: MRI IMAGING | Facility: HOSPITAL | Age: 86
Discharge: HOME | DRG: 072 | End: 2021-01-01
Attending: Other
Payer: MEDICARE

## 2021-01-01 ENCOUNTER — HOSPITAL ENCOUNTER (INPATIENT)
Facility: HOSPITAL | Age: 86
LOS: 2 days | Discharge: HOME HEALTH | DRG: 072 | End: 2021-01-01
Attending: STUDENT IN AN ORGANIZED HEALTH CARE EDUCATION/TRAINING PROGRAM | Admitting: STUDENT IN AN ORGANIZED HEALTH CARE EDUCATION/TRAINING PROGRAM
Payer: MEDICARE

## 2021-01-01 ENCOUNTER — NURSE ONLY (OUTPATIENT)
Dept: ELECTROPHYSIOLOGY | Facility: HOSPITAL | Age: 86
DRG: 098 | End: 2021-01-01
Attending: NURSE PRACTITIONER
Payer: MEDICARE

## 2021-01-01 ENCOUNTER — APPOINTMENT (OUTPATIENT)
Dept: GENERAL RADIOLOGY | Facility: HOSPITAL | Age: 86
DRG: 098 | End: 2021-01-01
Payer: MEDICARE

## 2021-01-01 ENCOUNTER — NURSE ONLY (OUTPATIENT)
Dept: LAB | Age: 86
End: 2021-01-01
Attending: INTERNAL MEDICINE
Payer: MEDICARE

## 2021-01-01 ENCOUNTER — APPOINTMENT (OUTPATIENT)
Dept: MRI IMAGING | Facility: HOSPITAL | Age: 86
DRG: 098 | End: 2021-01-01
Attending: HOSPITALIST
Payer: MEDICARE

## 2021-01-01 ENCOUNTER — HOSPITAL ENCOUNTER (INPATIENT)
Facility: HOSPITAL | Age: 86
LOS: 6 days | Discharge: NURSING, REHAB OR LONG-TERM CARE | DRG: 098 | End: 2021-01-01
Attending: EMERGENCY MEDICINE | Admitting: HOSPITALIST
Payer: MEDICARE

## 2021-01-01 VITALS
RESPIRATION RATE: 18 BRPM | HEART RATE: 88 BPM | DIASTOLIC BLOOD PRESSURE: 92 MMHG | TEMPERATURE: 98 F | WEIGHT: 143.19 LBS | OXYGEN SATURATION: 98 % | SYSTOLIC BLOOD PRESSURE: 136 MMHG | BODY MASS INDEX: 29 KG/M2

## 2021-01-01 VITALS
HEART RATE: 69 BPM | OXYGEN SATURATION: 96 % | RESPIRATION RATE: 18 BRPM | DIASTOLIC BLOOD PRESSURE: 48 MMHG | TEMPERATURE: 98 F | WEIGHT: 145.5 LBS | SYSTOLIC BLOOD PRESSURE: 112 MMHG | BODY MASS INDEX: 29 KG/M2

## 2021-01-01 VITALS
BODY MASS INDEX: 27 KG/M2 | RESPIRATION RATE: 16 BRPM | DIASTOLIC BLOOD PRESSURE: 80 MMHG | SYSTOLIC BLOOD PRESSURE: 134 MMHG | HEART RATE: 70 BPM | WEIGHT: 140.31 LBS | TEMPERATURE: 99 F | OXYGEN SATURATION: 98 %

## 2021-01-01 VITALS
HEART RATE: 68 BPM | TEMPERATURE: 98 F | SYSTOLIC BLOOD PRESSURE: 164 MMHG | DIASTOLIC BLOOD PRESSURE: 67 MMHG | OXYGEN SATURATION: 98 % | WEIGHT: 143.31 LBS | HEIGHT: 59.02 IN | RESPIRATION RATE: 18 BRPM | BODY MASS INDEX: 28.89 KG/M2

## 2021-01-01 VITALS
DIASTOLIC BLOOD PRESSURE: 76 MMHG | WEIGHT: 143.88 LBS | BODY MASS INDEX: 29 KG/M2 | TEMPERATURE: 100 F | HEART RATE: 104 BPM | OXYGEN SATURATION: 98 % | SYSTOLIC BLOOD PRESSURE: 150 MMHG

## 2021-01-01 VITALS
SYSTOLIC BLOOD PRESSURE: 126 MMHG | HEIGHT: 60 IN | HEART RATE: 74 BPM | RESPIRATION RATE: 16 BRPM | BODY MASS INDEX: 27.48 KG/M2 | WEIGHT: 140 LBS | DIASTOLIC BLOOD PRESSURE: 78 MMHG

## 2021-01-01 VITALS
WEIGHT: 145 LBS | RESPIRATION RATE: 16 BRPM | SYSTOLIC BLOOD PRESSURE: 114 MMHG | HEIGHT: 59 IN | BODY MASS INDEX: 29.23 KG/M2 | HEART RATE: 68 BPM | DIASTOLIC BLOOD PRESSURE: 78 MMHG

## 2021-01-01 VITALS
WEIGHT: 147.31 LBS | SYSTOLIC BLOOD PRESSURE: 140 MMHG | OXYGEN SATURATION: 95 % | RESPIRATION RATE: 17 BRPM | DIASTOLIC BLOOD PRESSURE: 84 MMHG | TEMPERATURE: 98 F | BODY MASS INDEX: 29 KG/M2 | HEART RATE: 84 BPM

## 2021-01-01 VITALS
RESPIRATION RATE: 16 BRPM | WEIGHT: 143 LBS | HEART RATE: 74 BPM | BODY MASS INDEX: 28.07 KG/M2 | DIASTOLIC BLOOD PRESSURE: 74 MMHG | HEIGHT: 60 IN | SYSTOLIC BLOOD PRESSURE: 132 MMHG

## 2021-01-01 VITALS
TEMPERATURE: 97 F | SYSTOLIC BLOOD PRESSURE: 124 MMHG | BODY MASS INDEX: 29 KG/M2 | RESPIRATION RATE: 18 BRPM | WEIGHT: 142.31 LBS | DIASTOLIC BLOOD PRESSURE: 62 MMHG | OXYGEN SATURATION: 99 % | HEART RATE: 64 BPM

## 2021-01-01 VITALS
HEART RATE: 92 BPM | BODY MASS INDEX: 28 KG/M2 | RESPIRATION RATE: 18 BRPM | OXYGEN SATURATION: 95 % | TEMPERATURE: 99 F | SYSTOLIC BLOOD PRESSURE: 136 MMHG | WEIGHT: 140 LBS | DIASTOLIC BLOOD PRESSURE: 82 MMHG

## 2021-01-01 DIAGNOSIS — G93.89 BRAIN MASS: ICD-10-CM

## 2021-01-01 DIAGNOSIS — F09 COGNITIVE DYSFUNCTION: ICD-10-CM

## 2021-01-01 DIAGNOSIS — R40.0 SOMNOLENCE: ICD-10-CM

## 2021-01-01 DIAGNOSIS — C71.9 GLIOMA (HCC): Primary | ICD-10-CM

## 2021-01-01 DIAGNOSIS — R10.9 LEFT SIDED ABDOMINAL PAIN: ICD-10-CM

## 2021-01-01 DIAGNOSIS — R41.89 COGNITIVE CHANGES: ICD-10-CM

## 2021-01-01 DIAGNOSIS — R63.0 ANOREXIA: ICD-10-CM

## 2021-01-01 DIAGNOSIS — R94.01 ABNORMAL EEG: ICD-10-CM

## 2021-01-01 DIAGNOSIS — N18.31 STAGE 3A CHRONIC KIDNEY DISEASE (HCC): ICD-10-CM

## 2021-01-01 DIAGNOSIS — F32.9 REACTIVE DEPRESSION: ICD-10-CM

## 2021-01-01 DIAGNOSIS — G04.90 ENCEPHALITIS: ICD-10-CM

## 2021-01-01 DIAGNOSIS — Z17.0 MALIGNANT NEOPLASM OF CENTRAL PORTION OF LEFT BREAST IN FEMALE, ESTROGEN RECEPTOR POSITIVE (HCC): ICD-10-CM

## 2021-01-01 DIAGNOSIS — I10 ESSENTIAL HYPERTENSION: ICD-10-CM

## 2021-01-01 DIAGNOSIS — I10 BENIGN ESSENTIAL HTN: ICD-10-CM

## 2021-01-01 DIAGNOSIS — F41.1 GENERALIZED ANXIETY DISORDER: ICD-10-CM

## 2021-01-01 DIAGNOSIS — R53.1 WEAKNESS: ICD-10-CM

## 2021-01-01 DIAGNOSIS — F32.A DEPRESSION, UNSPECIFIED DEPRESSION TYPE: ICD-10-CM

## 2021-01-01 DIAGNOSIS — R11.0 NAUSEA: ICD-10-CM

## 2021-01-01 DIAGNOSIS — F09 MILD COGNITIVE DISORDER: ICD-10-CM

## 2021-01-01 DIAGNOSIS — D64.9 ANEMIA, UNSPECIFIED TYPE: ICD-10-CM

## 2021-01-01 DIAGNOSIS — G04.90 ENCEPHALITIS: Primary | ICD-10-CM

## 2021-01-01 DIAGNOSIS — Z11.59 SCREENING EXAMINATION FOR POLIOMYELITIS: Primary | ICD-10-CM

## 2021-01-01 DIAGNOSIS — G04.81 MENINGOENCEPHALITIS, EXCEPT BACTERIAL: ICD-10-CM

## 2021-01-01 DIAGNOSIS — R41.0 CONFUSION: ICD-10-CM

## 2021-01-01 DIAGNOSIS — R62.51 FAILURE TO THRIVE (0-17): ICD-10-CM

## 2021-01-01 DIAGNOSIS — H53.9 VISUAL DISTURBANCES: ICD-10-CM

## 2021-01-01 DIAGNOSIS — R39.9 URINARY SYMPTOM OR SIGN: Primary | ICD-10-CM

## 2021-01-01 DIAGNOSIS — I63.9 ACUTE CVA (CEREBROVASCULAR ACCIDENT) (HCC): Primary | ICD-10-CM

## 2021-01-01 DIAGNOSIS — F43.21 GRIEF REACTION: ICD-10-CM

## 2021-01-01 DIAGNOSIS — R90.0 INTRACRANIAL SPACE-OCCUPYING LESION: Primary | ICD-10-CM

## 2021-01-01 DIAGNOSIS — C71.9 GLIOMA OF BRAIN (HCC): ICD-10-CM

## 2021-01-01 DIAGNOSIS — H02.421 MYOGENIC PTOSIS OF RIGHT EYELID: ICD-10-CM

## 2021-01-01 DIAGNOSIS — R39.9 URINARY SYMPTOM OR SIGN: ICD-10-CM

## 2021-01-01 DIAGNOSIS — C71.9 GLIOMA OF BRAIN (HCC): Primary | ICD-10-CM

## 2021-01-01 DIAGNOSIS — Z02.9 ENCOUNTERS FOR UNSPECIFIED ADMINISTRATIVE PURPOSE: ICD-10-CM

## 2021-01-01 DIAGNOSIS — Z74.1 DEPENDENT FOR MEDICATION ADMINISTRATION: ICD-10-CM

## 2021-01-01 DIAGNOSIS — Z23 NEED FOR VACCINATION: ICD-10-CM

## 2021-01-01 DIAGNOSIS — C50.112 MALIGNANT NEOPLASM OF CENTRAL PORTION OF LEFT BREAST IN FEMALE, ESTROGEN RECEPTOR POSITIVE (HCC): ICD-10-CM

## 2021-01-01 DIAGNOSIS — C71.9 GLIOMA, MALIGNANT (HCC): Primary | ICD-10-CM

## 2021-01-01 DIAGNOSIS — K57.92 DIVERTICULITIS: ICD-10-CM

## 2021-01-01 DIAGNOSIS — Z11.59 ENCOUNTER FOR SCREENING FOR OTHER VIRAL DISEASES: ICD-10-CM

## 2021-01-01 DIAGNOSIS — R10.32 ABDOMINAL PAIN, LEFT LOWER QUADRANT: Primary | ICD-10-CM

## 2021-01-01 LAB
ALBUMIN SERPL-MCNC: 2.8 G/DL (ref 3.4–5)
ALBUMIN SERPL-MCNC: 3.4 G/DL (ref 3.4–5)
ALBUMIN SERPL-MCNC: 3.6 G/DL (ref 3.4–5)
ALBUMIN/GLOB SERPL: 0.9 {RATIO} (ref 1–2)
ALBUMIN/GLOB SERPL: 1 {RATIO} (ref 1–2)
ALBUMIN/GLOB SERPL: 1.1 {RATIO} (ref 1–2)
ALP LIVER SERPL-CCNC: 107 U/L
ALP LIVER SERPL-CCNC: 118 U/L
ALP LIVER SERPL-CCNC: 94 U/L
ALT SERPL-CCNC: 21 U/L
ALT SERPL-CCNC: 22 U/L
ALT SERPL-CCNC: 25 U/L
ANA SCREEN: NEGATIVE
ANGIOTENSIN CONVERTING ENZYME: 22 U/L
ANION GAP SERPL CALC-SCNC: 0 MMOL/L (ref 0–18)
ANION GAP SERPL CALC-SCNC: 3 MMOL/L (ref 0–18)
ANION GAP SERPL CALC-SCNC: 4 MMOL/L (ref 0–18)
ANION GAP SERPL CALC-SCNC: 5 MMOL/L (ref 0–18)
ANION GAP SERPL CALC-SCNC: 5 MMOL/L (ref 0–18)
ANION GAP SERPL CALC-SCNC: 7 MMOL/L (ref 0–18)
APPEARANCE: CLEAR
APTT PPP: 33.1 SECONDS (ref 25.4–36.1)
AST SERPL-CCNC: 15 U/L (ref 15–37)
AST SERPL-CCNC: 17 U/L (ref 15–37)
AST SERPL-CCNC: 19 U/L (ref 15–37)
ATRIAL RATE: 81 BPM
BASOPHIL CSF: 0 %
BASOPHILS # BLD AUTO: 0.01 X10(3) UL (ref 0–0.2)
BASOPHILS # BLD AUTO: 0.04 X10(3) UL (ref 0–0.2)
BASOPHILS # BLD AUTO: 0.06 X10(3) UL (ref 0–0.2)
BASOPHILS # BLD AUTO: 0.06 X10(3) UL (ref 0–0.2)
BASOPHILS # BLD AUTO: 0.07 X10(3) UL (ref 0–0.2)
BASOPHILS NFR BLD AUTO: 0.2 %
BASOPHILS NFR BLD AUTO: 0.8 %
BASOPHILS NFR BLD AUTO: 0.8 %
BASOPHILS NFR BLD AUTO: 0.9 %
BASOPHILS NFR BLD AUTO: 1 %
BILIRUB SERPL-MCNC: 0.3 MG/DL (ref 0.1–2)
BILIRUB SERPL-MCNC: 0.3 MG/DL (ref 0.1–2)
BILIRUB SERPL-MCNC: 0.4 MG/DL (ref 0.1–2)
BILIRUB UR QL STRIP.AUTO: NEGATIVE
BILIRUB UR QL STRIP.AUTO: NEGATIVE
BILIRUBIN: NEGATIVE
BUN BLD-MCNC: 22 MG/DL (ref 7–18)
BUN BLD-MCNC: 22 MG/DL (ref 7–18)
BUN BLD-MCNC: 23 MG/DL (ref 7–18)
BUN BLD-MCNC: 26 MG/DL (ref 7–18)
BUN BLD-MCNC: 29 MG/DL (ref 7–18)
BUN BLD-MCNC: 32 MG/DL (ref 7–18)
BUN/CREAT SERPL: 14.8 (ref 10–20)
BUN/CREAT SERPL: 14.9 (ref 10–20)
BUN/CREAT SERPL: 15.3 (ref 10–20)
BUN/CREAT SERPL: 15.9 (ref 10–20)
BUN/CREAT SERPL: 17 (ref 10–20)
BUN/CREAT SERPL: 17.6 (ref 10–20)
BUN/CREAT SERPL: 19.1 (ref 10–20)
BUN/CREAT SERPL: 20.1 (ref 10–20)
CALCIUM BLD-MCNC: 10.1 MG/DL (ref 8.5–10.1)
CALCIUM BLD-MCNC: 10.3 MG/DL (ref 8.5–10.1)
CALCIUM BLD-MCNC: 9.3 MG/DL (ref 8.5–10.1)
CALCIUM BLD-MCNC: 9.5 MG/DL (ref 8.5–10.1)
CALCIUM BLD-MCNC: 9.5 MG/DL (ref 8.5–10.1)
CALCIUM BLD-MCNC: 9.6 MG/DL (ref 8.5–10.1)
CALCIUM BLD-MCNC: 9.6 MG/DL (ref 8.5–10.1)
CALCIUM BLD-MCNC: 9.9 MG/DL (ref 8.5–10.1)
CHLORIDE SERPL-SCNC: 108 MMOL/L (ref 98–112)
CHLORIDE SERPL-SCNC: 109 MMOL/L (ref 98–112)
CHLORIDE SERPL-SCNC: 110 MMOL/L (ref 98–112)
CHLORIDE SERPL-SCNC: 111 MMOL/L (ref 98–112)
CHLORIDE SERPL-SCNC: 113 MMOL/L (ref 98–112)
CHLORIDE SERPL-SCNC: 113 MMOL/L (ref 98–112)
CHOLEST SMN-MCNC: 151 MG/DL (ref ?–200)
CLARITY CSF: CLEAR
CLARITY CSF: CLEAR
CLARITY UR REFRACT.AUTO: CLEAR
CO2 SERPL-SCNC: 24 MMOL/L (ref 21–32)
CO2 SERPL-SCNC: 24 MMOL/L (ref 21–32)
CO2 SERPL-SCNC: 25 MMOL/L (ref 21–32)
CO2 SERPL-SCNC: 26 MMOL/L (ref 21–32)
CO2 SERPL-SCNC: 26 MMOL/L (ref 21–32)
CO2 SERPL-SCNC: 27 MMOL/L (ref 21–32)
CO2 SERPL-SCNC: 28 MMOL/L (ref 21–32)
CO2 SERPL-SCNC: 28 MMOL/L (ref 21–32)
COLOR CSF: COLORLESS
COLOR UR AUTO: YELLOW
COUNT PERFORMED ON TUBE: 1
COUNT PERFORMED ON TUBE: 4
CREAT BLD-MCNC: 1.31 MG/DL
CREAT BLD-MCNC: 1.36 MG/DL
CREAT BLD-MCNC: 1.44 MG/DL
CREAT BLD-MCNC: 1.44 MG/DL
CREAT BLD-MCNC: 1.45 MG/DL
CREAT BLD-MCNC: 1.49 MG/DL
CREAT BLD-MCNC: 1.54 MG/DL
CREAT BLD-MCNC: 1.88 MG/DL
CRP SERPL-MCNC: <0.29 MG/DL (ref ?–0.3)
D-DIMER: 0.67 UG/ML FEU (ref ?–0.89)
DEPRECATED RDW RBC AUTO: 45.1 FL (ref 35.1–46.3)
DEPRECATED RDW RBC AUTO: 45.1 FL (ref 35.1–46.3)
DEPRECATED RDW RBC AUTO: 45.2 FL (ref 35.1–46.3)
DEPRECATED RDW RBC AUTO: 45.8 FL (ref 35.1–46.3)
DEPRECATED RDW RBC AUTO: 51.8 FL (ref 35.1–46.3)
DEPRECATED RDW RBC AUTO: 53.3 FL (ref 35.1–46.3)
EOSINOPHIL # BLD AUTO: 0.02 X10(3) UL (ref 0–0.7)
EOSINOPHIL # BLD AUTO: 0.03 X10(3) UL (ref 0–0.7)
EOSINOPHIL # BLD AUTO: 0.09 X10(3) UL (ref 0–0.7)
EOSINOPHIL # BLD AUTO: 0.1 X10(3) UL (ref 0–0.7)
EOSINOPHIL # BLD AUTO: 0.16 X10(3) UL (ref 0–0.7)
EOSINOPHIL NFR BLD AUTO: 0.3 %
EOSINOPHIL NFR BLD AUTO: 0.5 %
EOSINOPHIL NFR BLD AUTO: 1 %
EOSINOPHIL NFR BLD AUTO: 2 %
EOSINOPHIL NFR BLD AUTO: 2.6 %
EOSINOPHILS CSF: 0 %
ERYTHROCYTE [DISTWIDTH] IN BLOOD BY AUTOMATED COUNT: 12.6 % (ref 11–15)
ERYTHROCYTE [DISTWIDTH] IN BLOOD BY AUTOMATED COUNT: 12.7 % (ref 11–15)
ERYTHROCYTE [DISTWIDTH] IN BLOOD BY AUTOMATED COUNT: 12.8 % (ref 11–15)
ERYTHROCYTE [DISTWIDTH] IN BLOOD BY AUTOMATED COUNT: 13.2 % (ref 11–15)
ERYTHROCYTE [DISTWIDTH] IN BLOOD BY AUTOMATED COUNT: 14.9 % (ref 11–15)
ERYTHROCYTE [DISTWIDTH] IN BLOOD BY AUTOMATED COUNT: 15 % (ref 11–15)
EST. AVERAGE GLUCOSE BLD GHB EST-MCNC: 111 MG/DL (ref 68–126)
GLOBULIN PLAS-MCNC: 2.8 G/DL (ref 2.8–4.4)
GLOBULIN PLAS-MCNC: 3.4 G/DL (ref 2.8–4.4)
GLOBULIN PLAS-MCNC: 3.6 G/DL (ref 2.8–4.4)
GLUCOSE (URINE DIPSTICK): NEGATIVE MG/DL
GLUCOSE BLD-MCNC: 101 MG/DL (ref 70–99)
GLUCOSE BLD-MCNC: 102 MG/DL (ref 70–99)
GLUCOSE BLD-MCNC: 102 MG/DL (ref 70–99)
GLUCOSE BLD-MCNC: 104 MG/DL (ref 70–99)
GLUCOSE BLD-MCNC: 105 MG/DL (ref 70–99)
GLUCOSE BLD-MCNC: 107 MG/DL (ref 70–99)
GLUCOSE BLD-MCNC: 110 MG/DL (ref 70–99)
GLUCOSE BLD-MCNC: 112 MG/DL (ref 70–99)
GLUCOSE BLD-MCNC: 117 MG/DL (ref 70–99)
GLUCOSE BLD-MCNC: 126 MG/DL (ref 70–99)
GLUCOSE BLD-MCNC: 136 MG/DL (ref 70–99)
GLUCOSE BLD-MCNC: 143 MG/DL (ref 70–99)
GLUCOSE BLD-MCNC: 146 MG/DL (ref 70–99)
GLUCOSE BLD-MCNC: 146 MG/DL (ref 70–99)
GLUCOSE BLD-MCNC: 74 MG/DL (ref 70–99)
GLUCOSE BLD-MCNC: 78 MG/DL (ref 70–99)
GLUCOSE BLD-MCNC: 80 MG/DL (ref 70–99)
GLUCOSE BLD-MCNC: 82 MG/DL (ref 70–99)
GLUCOSE BLD-MCNC: 83 MG/DL (ref 70–99)
GLUCOSE BLD-MCNC: 83 MG/DL (ref 70–99)
GLUCOSE BLD-MCNC: 84 MG/DL (ref 70–99)
GLUCOSE BLD-MCNC: 85 MG/DL (ref 70–99)
GLUCOSE BLD-MCNC: 86 MG/DL (ref 70–99)
GLUCOSE BLD-MCNC: 88 MG/DL (ref 70–99)
GLUCOSE BLD-MCNC: 89 MG/DL (ref 70–99)
GLUCOSE BLD-MCNC: 90 MG/DL (ref 70–99)
GLUCOSE BLD-MCNC: 94 MG/DL (ref 70–99)
GLUCOSE BLD-MCNC: 97 MG/DL (ref 70–99)
GLUCOSE BLD-MCNC: 98 MG/DL (ref 70–99)
GLUCOSE CSF-MCNC: 59 MG/DL (ref 40–70)
GLUCOSE UR STRIP.AUTO-MCNC: NEGATIVE MG/DL
GLUCOSE UR STRIP.AUTO-MCNC: NEGATIVE MG/DL
HAV IGM SER QL: 2.2 MG/DL (ref 1.6–2.6)
HBA1C MFR BLD HPLC: 5.5 % (ref ?–5.7)
HCT VFR BLD AUTO: 34.2 %
HCT VFR BLD AUTO: 34.6 %
HCT VFR BLD AUTO: 36.5 %
HCT VFR BLD AUTO: 38 %
HCT VFR BLD AUTO: 40.6 %
HCT VFR BLD AUTO: 42 %
HDLC SERPL-MCNC: 53 MG/DL (ref 40–59)
HGB BLD-MCNC: 11.5 G/DL
HGB BLD-MCNC: 11.5 G/DL
HGB BLD-MCNC: 11.9 G/DL
HGB BLD-MCNC: 11.9 G/DL
HGB BLD-MCNC: 12.9 G/DL
HGB BLD-MCNC: 13.6 G/DL
HSV 1 SUBTYPE BY PCR: NOT DETECTED
HSV 2 SUBTYPE BY PCR: NOT DETECTED
IMM GRANULOCYTES # BLD AUTO: 0.01 X10(3) UL (ref 0–1)
IMM GRANULOCYTES # BLD AUTO: 0.02 X10(3) UL (ref 0–1)
IMM GRANULOCYTES # BLD AUTO: 0.03 X10(3) UL (ref 0–1)
IMM GRANULOCYTES # BLD AUTO: 0.03 X10(3) UL (ref 0–1)
IMM GRANULOCYTES # BLD AUTO: 0.04 X10(3) UL (ref 0–1)
IMM GRANULOCYTES NFR BLD: 0.2 %
IMM GRANULOCYTES NFR BLD: 0.3 %
IMM GRANULOCYTES NFR BLD: 0.3 %
IMM GRANULOCYTES NFR BLD: 0.5 %
IMM GRANULOCYTES NFR BLD: 0.7 %
INR BLD: 1.04 (ref 0.89–1.11)
IRON SATURATION: 26 %
IRON SERPL-MCNC: 77 UG/DL
KETONES (URINE DIPSTICK): NEGATIVE MG/DL
KETONES UR STRIP.AUTO-MCNC: NEGATIVE MG/DL
LDLC SERPL CALC-MCNC: 79 MG/DL (ref ?–100)
LEUKOCYTE ESTERASE UR QL STRIP.AUTO: NEGATIVE
LYMPHOCYTES # BLD AUTO: 1.01 X10(3) UL (ref 1–4)
LYMPHOCYTES # BLD AUTO: 1.6 X10(3) UL (ref 1–4)
LYMPHOCYTES # BLD AUTO: 1.62 X10(3) UL (ref 1–4)
LYMPHOCYTES # BLD AUTO: 1.87 X10(3) UL (ref 1–4)
LYMPHOCYTES # BLD AUTO: 1.93 X10(3) UL (ref 1–4)
LYMPHOCYTES CSF: 39 %
LYMPHOCYTES NFR BLD AUTO: 16.3 %
LYMPHOCYTES NFR BLD AUTO: 17.4 %
LYMPHOCYTES NFR BLD AUTO: 25 %
LYMPHOCYTES NFR BLD AUTO: 30.7 %
LYMPHOCYTES NFR BLD AUTO: 39.1 %
M PROTEIN MFR SERPL ELPH: 5.6 G/DL (ref 6.4–8.2)
M PROTEIN MFR SERPL ELPH: 7 G/DL (ref 6.4–8.2)
M PROTEIN MFR SERPL ELPH: 7 G/DL (ref 6.4–8.2)
MCH RBC QN AUTO: 30.7 PG (ref 26–34)
MCH RBC QN AUTO: 31 PG (ref 26–34)
MCH RBC QN AUTO: 31.5 PG (ref 26–34)
MCH RBC QN AUTO: 31.8 PG (ref 26–34)
MCH RBC QN AUTO: 31.9 PG (ref 26–34)
MCH RBC QN AUTO: 32.1 PG (ref 26–34)
MCHC RBC AUTO-ENTMCNC: 31.3 G/DL (ref 31–37)
MCHC RBC AUTO-ENTMCNC: 31.8 G/DL (ref 31–37)
MCHC RBC AUTO-ENTMCNC: 32.4 G/DL (ref 31–37)
MCHC RBC AUTO-ENTMCNC: 32.6 G/DL (ref 31–37)
MCHC RBC AUTO-ENTMCNC: 33.2 G/DL (ref 31–37)
MCHC RBC AUTO-ENTMCNC: 33.6 G/DL (ref 31–37)
MCV RBC AUTO: 94.8 FL
MCV RBC AUTO: 95.5 FL
MCV RBC AUTO: 97.6 FL
MCV RBC AUTO: 97.9 FL
MCV RBC AUTO: 97.9 FL
MCV RBC AUTO: 98.1 FL
MONOCYTES # BLD AUTO: 0.1 X10(3) UL (ref 0.1–1)
MONOCYTES # BLD AUTO: 0.27 X10(3) UL (ref 0.1–1)
MONOCYTES # BLD AUTO: 0.38 X10(3) UL (ref 0.1–1)
MONOCYTES # BLD AUTO: 0.42 X10(3) UL (ref 0.1–1)
MONOCYTES # BLD AUTO: 0.43 X10(3) UL (ref 0.1–1)
MONOCYTES NFR BLD AUTO: 1.6 %
MONOCYTES NFR BLD AUTO: 2.9 %
MONOCYTES NFR BLD AUTO: 5.9 %
MONOCYTES NFR BLD AUTO: 7 %
MONOCYTES NFR BLD AUTO: 8.5 %
MONOMACROPHAGES CSF: 15 %
MULTISTIX LOT#: 1044 NUMERIC
NEUTROPHILS # BLD AUTO: 2.43 X10 (3) UL (ref 1.5–7.7)
NEUTROPHILS # BLD AUTO: 2.43 X10(3) UL (ref 1.5–7.7)
NEUTROPHILS # BLD AUTO: 3.54 X10 (3) UL (ref 1.5–7.7)
NEUTROPHILS # BLD AUTO: 3.54 X10(3) UL (ref 1.5–7.7)
NEUTROPHILS # BLD AUTO: 4.35 X10 (3) UL (ref 1.5–7.7)
NEUTROPHILS # BLD AUTO: 4.35 X10(3) UL (ref 1.5–7.7)
NEUTROPHILS # BLD AUTO: 5.04 X10 (3) UL (ref 1.5–7.7)
NEUTROPHILS # BLD AUTO: 5.04 X10(3) UL (ref 1.5–7.7)
NEUTROPHILS # BLD AUTO: 7.13 X10 (3) UL (ref 1.5–7.7)
NEUTROPHILS # BLD AUTO: 7.13 X10(3) UL (ref 1.5–7.7)
NEUTROPHILS CSF: 46 %
NEUTROPHILS NFR BLD AUTO: 49.4 %
NEUTROPHILS NFR BLD AUTO: 58 %
NEUTROPHILS NFR BLD AUTO: 67.2 %
NEUTROPHILS NFR BLD AUTO: 77.6 %
NEUTROPHILS NFR BLD AUTO: 81.3 %
NITRITE UR QL STRIP.AUTO: NEGATIVE
NITRITE UR QL STRIP.AUTO: NEGATIVE
NITRITE, URINE: NEGATIVE
NON GYNE INTERPRETATION: NEGATIVE
NONHDLC SERPL-MCNC: 98 MG/DL (ref ?–130)
NT-PROBNP SERPL-MCNC: 351 PG/ML (ref ?–450)
NT-PROBNP SERPL-MCNC: 355 PG/ML (ref ?–450)
OSMOLALITY SERPL CALC.SUM OF ELEC: 291 MOSM/KG (ref 275–295)
OSMOLALITY SERPL CALC.SUM OF ELEC: 293 MOSM/KG (ref 275–295)
OSMOLALITY SERPL CALC.SUM OF ELEC: 293 MOSM/KG (ref 275–295)
OSMOLALITY SERPL CALC.SUM OF ELEC: 294 MOSM/KG (ref 275–295)
OSMOLALITY SERPL CALC.SUM OF ELEC: 296 MOSM/KG (ref 275–295)
OSMOLALITY SERPL CALC.SUM OF ELEC: 297 MOSM/KG (ref 275–295)
OSMOLALITY SERPL CALC.SUM OF ELEC: 298 MOSM/KG (ref 275–295)
OSMOLALITY SERPL CALC.SUM OF ELEC: 299 MOSM/KG (ref 275–295)
P AXIS: 41 DEGREES
P-R INTERVAL: 202 MS
PATIENT FASTING Y/N/NP: YES
PATIENT FASTING Y/N/NP: YES
PH UR STRIP.AUTO: 6 [PH] (ref 4.5–8)
PH UR STRIP.AUTO: 7 [PH] (ref 4.5–8)
PH, URINE: 6.5 (ref 4.5–8)
PLATELET # BLD AUTO: 217 10(3)UL (ref 150–450)
PLATELET # BLD AUTO: 226 10(3)UL (ref 150–450)
PLATELET # BLD AUTO: 250 10(3)UL (ref 150–450)
PLATELET # BLD AUTO: 251 10(3)UL (ref 150–450)
PLATELET # BLD AUTO: 271 10(3)UL (ref 150–450)
PLATELET # BLD AUTO: 274 10(3)UL (ref 150–450)
POTASSIUM SERPL-SCNC: 3.7 MMOL/L (ref 3.5–5.1)
POTASSIUM SERPL-SCNC: 3.8 MMOL/L (ref 3.5–5.1)
POTASSIUM SERPL-SCNC: 3.8 MMOL/L (ref 3.5–5.1)
POTASSIUM SERPL-SCNC: 4 MMOL/L (ref 3.5–5.1)
POTASSIUM SERPL-SCNC: 4 MMOL/L (ref 3.5–5.1)
POTASSIUM SERPL-SCNC: 4.1 MMOL/L (ref 3.5–5.1)
PROT PATTERN CSF ELPH-IMP: 49 MG/DL (ref 15–45)
PROT UR STRIP.AUTO-MCNC: 30 MG/DL
PROT UR STRIP.AUTO-MCNC: 30 MG/DL
PROTEIN (URINE DIPSTICK): 30 MG/DL
PSA SERPL DL<=0.01 NG/ML-MCNC: 13.9 SECONDS (ref 12.4–14.6)
Q-T INTERVAL: 372 MS
QRS DURATION: 90 MS
QTC CALCULATION (BEZET): 432 MS
R AXIS: -4 DEGREES
RBC # BLD AUTO: 3.58 X10(6)UL
RBC # BLD AUTO: 3.65 X10(6)UL
RBC # BLD AUTO: 3.73 X10(6)UL
RBC # BLD AUTO: 3.88 X10(6)UL
RBC # BLD AUTO: 4.16 X10(6)UL
RBC # BLD AUTO: 4.28 X10(6)UL
RBC # CSF: 626 /MM3
RBC CSF: 4000 /MM3
RBC UR QL AUTO: NEGATIVE
RHEUMATOID FACT SERPL-ACNC: 13 IU/ML (ref ?–15)
SARS-COV-2 RNA RESP QL NAA+PROBE: NOT DETECTED
SED RATE-ML: 16 MM/HR
SODIUM SERPL-SCNC: 139 MMOL/L (ref 136–145)
SODIUM SERPL-SCNC: 140 MMOL/L (ref 136–145)
SODIUM SERPL-SCNC: 141 MMOL/L (ref 136–145)
SODIUM SERPL-SCNC: 141 MMOL/L (ref 136–145)
SODIUM SERPL-SCNC: 142 MMOL/L (ref 136–145)
SODIUM SERPL-SCNC: 143 MMOL/L (ref 136–145)
SP GR UR STRIP.AUTO: 1.01 (ref 1–1.03)
SP GR UR STRIP.AUTO: 1.01 (ref 1–1.03)
SPECIFIC GRAVITY: 1.01 (ref 1–1.03)
T AXIS: 36 DEGREES
TOTAL CELLS COUNTED: 100
TOTAL IRON BINDING CAPACITY: 294 UG/DL (ref 240–450)
TOTAL VOLUME CSF: 12 ML
TOTAL VOLUME CSF: 12 ML
TRANSFERRIN SERPL-MCNC: 197 MG/DL (ref 200–360)
TRIGL SERPL-MCNC: 95 MG/DL (ref 30–149)
TROPONIN I SERPL-MCNC: <0.045 NG/ML (ref ?–0.04)
TSI SER-ACNC: 2.72 MIU/ML (ref 0.36–3.74)
URINE-COLOR: YELLOW
UROBILINOGEN UR STRIP.AUTO-MCNC: <2 MG/DL
UROBILINOGEN UR STRIP.AUTO-MCNC: <2 MG/DL
UROBILINOGEN,SEMI-QN: 0.2 MG/DL (ref 0–1.9)
VENTRICULAR RATE: 81 BPM
VIT B12 SERPL-MCNC: 409 PG/ML (ref 193–986)
VLDLC SERPL CALC-MCNC: 19 MG/DL (ref 0–30)
WBC # BLD AUTO: 4.9 X10(3) UL (ref 4–11)
WBC # BLD AUTO: 6.1 X10(3) UL (ref 4–11)
WBC # BLD AUTO: 6.2 X10(3) UL (ref 4–11)
WBC # BLD AUTO: 6.5 X10(3) UL (ref 4–11)
WBC # BLD AUTO: 6.9 X10(3) UL (ref 4–11)
WBC # BLD AUTO: 9.2 X10(3) UL (ref 4–11)
WBC # CSF: 2 /MM3 (ref 0–5)
WBC # FLD MANUAL: 9 /MM3 (ref 0–5)
WBC #/AREA URNS AUTO: >50 /HPF
WBC CLUMPS UR QL AUTO: PRESENT
WEST NILE VIRUS RNA BY RT-PCR: NOT DETECTED

## 2021-01-01 PROCEDURE — 80053 COMPREHEN METABOLIC PANEL: CPT

## 2021-01-01 PROCEDURE — 1111F DSCHRG MED/CURRENT MED MERGE: CPT | Performed by: INTERNAL MEDICINE

## 2021-01-01 PROCEDURE — 99223 1ST HOSP IP/OBS HIGH 75: CPT | Performed by: HOSPITALIST

## 2021-01-01 PROCEDURE — 87086 URINE CULTURE/COLONY COUNT: CPT | Performed by: INTERNAL MEDICINE

## 2021-01-01 PROCEDURE — 99443 PHONE E/M BY PHYS 21-30 MIN: CPT | Performed by: INTERNAL MEDICINE

## 2021-01-01 PROCEDURE — 81001 URINALYSIS AUTO W/SCOPE: CPT | Performed by: INTERNAL MEDICINE

## 2021-01-01 PROCEDURE — 1111F DSCHRG MED/CURRENT MED MERGE: CPT | Performed by: OTHER

## 2021-01-01 PROCEDURE — 99215 OFFICE O/P EST HI 40 MIN: CPT | Performed by: INTERNAL MEDICINE

## 2021-01-01 PROCEDURE — 71045 X-RAY EXAM CHEST 1 VIEW: CPT | Performed by: EMERGENCY MEDICINE

## 2021-01-01 PROCEDURE — 1111F DSCHRG MED/CURRENT MED MERGE: CPT

## 2021-01-01 PROCEDURE — 86038 ANTINUCLEAR ANTIBODIES: CPT | Performed by: OTHER

## 2021-01-01 PROCEDURE — 99233 SBSQ HOSP IP/OBS HIGH 50: CPT | Performed by: OTHER

## 2021-01-01 PROCEDURE — 70450 CT HEAD/BRAIN W/O DYE: CPT | Performed by: EMERGENCY MEDICINE

## 2021-01-01 PROCEDURE — 70551 MRI BRAIN STEM W/O DYE: CPT | Performed by: HOSPITALIST

## 2021-01-01 PROCEDURE — 85027 COMPLETE CBC AUTOMATED: CPT | Performed by: INTERNAL MEDICINE

## 2021-01-01 PROCEDURE — 99214 OFFICE O/P EST MOD 30 MIN: CPT | Performed by: OTHER

## 2021-01-01 PROCEDURE — 83550 IRON BINDING TEST: CPT | Performed by: INTERNAL MEDICINE

## 2021-01-01 PROCEDURE — 95816 EEG AWAKE AND DROWSY: CPT | Performed by: OTHER

## 2021-01-01 PROCEDURE — 82164 ANGIOTENSIN I ENZYME TEST: CPT | Performed by: OTHER

## 2021-01-01 PROCEDURE — 99222 1ST HOSP IP/OBS MODERATE 55: CPT | Performed by: HOSPITALIST

## 2021-01-01 PROCEDURE — 74176 CT ABD & PELVIS W/O CONTRAST: CPT | Performed by: EMERGENCY MEDICINE

## 2021-01-01 PROCEDURE — 99232 SBSQ HOSP IP/OBS MODERATE 35: CPT | Performed by: HOSPITALIST

## 2021-01-01 PROCEDURE — 62328 DX LMBR SPI PNXR W/FLUOR/CT: CPT | Performed by: NURSE PRACTITIONER

## 2021-01-01 PROCEDURE — 99233 SBSQ HOSP IP/OBS HIGH 50: CPT | Performed by: HOSPITALIST

## 2021-01-01 PROCEDURE — 99214 OFFICE O/P EST MOD 30 MIN: CPT | Performed by: INTERNAL MEDICINE

## 2021-01-01 PROCEDURE — 36415 COLL VENOUS BLD VENIPUNCTURE: CPT

## 2021-01-01 PROCEDURE — 99239 HOSP IP/OBS DSCHRG MGMT >30: CPT | Performed by: HOSPITALIST

## 2021-01-01 PROCEDURE — 86140 C-REACTIVE PROTEIN: CPT | Performed by: OTHER

## 2021-01-01 PROCEDURE — 99221 1ST HOSP IP/OBS SF/LOW 40: CPT | Performed by: NURSE PRACTITIONER

## 2021-01-01 PROCEDURE — 83540 ASSAY OF IRON: CPT | Performed by: INTERNAL MEDICINE

## 2021-01-01 PROCEDURE — 84443 ASSAY THYROID STIM HORMONE: CPT | Performed by: INTERNAL MEDICINE

## 2021-01-01 PROCEDURE — 70553 MRI BRAIN STEM W/O & W/DYE: CPT | Performed by: OTHER

## 2021-01-01 PROCEDURE — A9575 INJ GADOTERATE MEGLUMI 0.1ML: HCPCS | Performed by: OTHER

## 2021-01-01 PROCEDURE — 85025 COMPLETE CBC W/AUTO DIFF WBC: CPT

## 2021-01-01 PROCEDURE — 99309 SBSQ NF CARE MODERATE MDM 30: CPT | Performed by: INTERNAL MEDICINE

## 2021-01-01 PROCEDURE — 80053 COMPREHEN METABOLIC PANEL: CPT | Performed by: INTERNAL MEDICINE

## 2021-01-01 PROCEDURE — 93306 TTE W/DOPPLER COMPLETE: CPT | Performed by: HOSPITALIST

## 2021-01-01 PROCEDURE — 86431 RHEUMATOID FACTOR QUANT: CPT | Performed by: OTHER

## 2021-01-01 PROCEDURE — 009U3ZX DRAINAGE OF SPINAL CANAL, PERCUTANEOUS APPROACH, DIAGNOSTIC: ICD-10-PCS | Performed by: RADIOLOGY

## 2021-01-01 PROCEDURE — 85652 RBC SED RATE AUTOMATED: CPT | Performed by: OTHER

## 2021-01-01 PROCEDURE — 81003 URINALYSIS AUTO W/O SCOPE: CPT | Performed by: INTERNAL MEDICINE

## 2021-01-01 PROCEDURE — 99214 OFFICE O/P EST MOD 30 MIN: CPT | Performed by: PHYSICIAN ASSISTANT

## 2021-01-01 PROCEDURE — 82607 VITAMIN B-12: CPT | Performed by: INTERNAL MEDICINE

## 2021-01-01 PROCEDURE — G2212 PROLONG OUTPT/OFFICE VIS: HCPCS | Performed by: INTERNAL MEDICINE

## 2021-01-01 PROCEDURE — 99223 1ST HOSP IP/OBS HIGH 75: CPT | Performed by: OTHER

## 2021-01-01 RX ORDER — SODIUM CHLORIDE 9 MG/ML
INJECTION, SOLUTION INTRAVENOUS CONTINUOUS
Status: DISCONTINUED | OUTPATIENT
Start: 2021-01-01 | End: 2021-01-01

## 2021-01-01 RX ORDER — TRIAMTERENE AND HYDROCHLOROTHIAZIDE 37.5; 25 MG/1; MG/1
1 CAPSULE ORAL EVERY MORNING
COMMUNITY
End: 2021-01-01

## 2021-01-01 RX ORDER — ENOXAPARIN SODIUM 100 MG/ML
30 INJECTION SUBCUTANEOUS NIGHTLY
Status: DISCONTINUED | OUTPATIENT
Start: 2021-01-01 | End: 2021-01-01

## 2021-01-01 RX ORDER — DORZOLAMIDE HYDROCHLORIDE AND TIMOLOL MALEATE 20; 5 MG/ML; MG/ML
1 SOLUTION/ DROPS OPHTHALMIC EVERY 12 HOURS
COMMUNITY

## 2021-01-01 RX ORDER — PAROXETINE HYDROCHLORIDE 20 MG/1
20 TABLET, FILM COATED ORAL DAILY
Qty: 90 TABLET | Refills: 3 | COMMUNITY
Start: 2021-01-01

## 2021-01-01 RX ORDER — LEVETIRACETAM 250 MG/1
250 TABLET ORAL 2 TIMES DAILY
Status: COMPLETED | OUTPATIENT
Start: 2021-01-01 | End: 2021-01-01

## 2021-01-01 RX ORDER — HYDRALAZINE HYDROCHLORIDE 20 MG/ML
10 INJECTION INTRAMUSCULAR; INTRAVENOUS EVERY 4 HOURS PRN
Status: DISCONTINUED | OUTPATIENT
Start: 2021-01-01 | End: 2021-01-01

## 2021-01-01 RX ORDER — FAMOTIDINE 20 MG/1
20 TABLET ORAL NIGHTLY
Status: DISCONTINUED | OUTPATIENT
Start: 2021-01-01 | End: 2021-01-01

## 2021-01-01 RX ORDER — VALACYCLOVIR HYDROCHLORIDE 500 MG/1
500 TABLET, FILM COATED ORAL 2 TIMES DAILY
Qty: 28 TABLET | Refills: 0 | Status: SHIPPED | OUTPATIENT
Start: 2021-01-01 | End: 2021-01-01

## 2021-01-01 RX ORDER — LEVETIRACETAM 500 MG/1
500 TABLET ORAL 2 TIMES DAILY
Qty: 60 TABLET | Refills: 1 | Status: SHIPPED | OUTPATIENT
Start: 2021-01-01 | End: 2021-01-01

## 2021-01-01 RX ORDER — AMOXICILLIN AND CLAVULANATE POTASSIUM 875; 125 MG/1; MG/1
1 TABLET, FILM COATED ORAL 2 TIMES DAILY
Status: ON HOLD | COMMUNITY
Start: 2021-01-01 | End: 2021-01-01

## 2021-01-01 RX ORDER — ACETAMINOPHEN 325 MG/1
650 TABLET ORAL EVERY 4 HOURS PRN
Status: DISCONTINUED | OUTPATIENT
Start: 2021-01-01 | End: 2021-01-01

## 2021-01-01 RX ORDER — ONDANSETRON 2 MG/ML
8 INJECTION INTRAMUSCULAR; INTRAVENOUS EVERY 6 HOURS PRN
Status: DISCONTINUED | OUTPATIENT
Start: 2021-01-01 | End: 2021-01-01

## 2021-01-01 RX ORDER — POTASSIUM CHLORIDE 20 MEQ/1
40 TABLET, EXTENDED RELEASE ORAL ONCE
Status: COMPLETED | OUTPATIENT
Start: 2021-01-01 | End: 2021-01-01

## 2021-01-01 RX ORDER — LABETALOL HYDROCHLORIDE 5 MG/ML
10 INJECTION, SOLUTION INTRAVENOUS EVERY 10 MIN PRN
Status: DISCONTINUED | OUTPATIENT
Start: 2021-01-01 | End: 2021-01-01

## 2021-01-01 RX ORDER — HYDROCHLOROTHIAZIDE 12.5 MG/1
12.5 TABLET ORAL DAILY
COMMUNITY
End: 2021-01-01

## 2021-01-01 RX ORDER — LEVETIRACETAM 500 MG/1
500 TABLET ORAL 2 TIMES DAILY
Status: DISCONTINUED | OUTPATIENT
Start: 2021-01-01 | End: 2021-01-01

## 2021-01-01 RX ORDER — PRAVASTATIN SODIUM 10 MG
10 TABLET ORAL NIGHTLY
COMMUNITY
End: 2021-01-01

## 2021-01-01 RX ORDER — LATANOPROST 50 UG/ML
1 SOLUTION/ DROPS OPHTHALMIC NIGHTLY
Status: DISCONTINUED | OUTPATIENT
Start: 2021-01-01 | End: 2021-01-01

## 2021-01-01 RX ORDER — DICYCLOMINE HYDROCHLORIDE 10 MG/1
10 CAPSULE ORAL DAILY
Status: ON HOLD | COMMUNITY
End: 2021-01-01

## 2021-01-01 RX ORDER — LEVETIRACETAM 500 MG/1
500 TABLET ORAL 2 TIMES DAILY
Qty: 60 TABLET | Refills: 0 | Status: SHIPPED | OUTPATIENT
Start: 2021-01-01 | End: 2021-01-01

## 2021-01-01 RX ORDER — LEVETIRACETAM 500 MG/1
TABLET ORAL
Qty: 60 TABLET | Refills: 1 | Status: SHIPPED | OUTPATIENT
Start: 2021-01-01

## 2021-01-01 RX ORDER — PANTOPRAZOLE SODIUM 40 MG/1
40 TABLET, DELAYED RELEASE ORAL
Status: DISCONTINUED | OUTPATIENT
Start: 2021-01-01 | End: 2021-01-01

## 2021-01-01 RX ORDER — PANTOPRAZOLE SODIUM 40 MG/1
40 TABLET, DELAYED RELEASE ORAL
COMMUNITY
End: 2021-01-01

## 2021-01-01 RX ORDER — MELATONIN
3 NIGHTLY PRN
Status: DISCONTINUED | OUTPATIENT
Start: 2021-01-01 | End: 2021-01-01

## 2021-01-01 RX ORDER — FAMOTIDINE 20 MG/1
20 TABLET ORAL 2 TIMES DAILY
Status: ON HOLD | COMMUNITY
End: 2021-01-01

## 2021-01-01 RX ORDER — LOSARTAN POTASSIUM 50 MG/1
50 TABLET ORAL DAILY
Status: DISCONTINUED | OUTPATIENT
Start: 2021-01-01 | End: 2021-01-01

## 2021-01-01 RX ORDER — DICYCLOMINE HYDROCHLORIDE 10 MG/1
10 CAPSULE ORAL DAILY
Status: DISCONTINUED | OUTPATIENT
Start: 2021-01-01 | End: 2021-01-01

## 2021-01-01 RX ORDER — DORZOLAMIDE HYDROCHLORIDE AND TIMOLOL MALEATE 20; 5 MG/ML; MG/ML
1 SOLUTION/ DROPS OPHTHALMIC EVERY 12 HOURS
Status: DISCONTINUED | OUTPATIENT
Start: 2021-01-01 | End: 2021-01-01

## 2021-01-01 RX ORDER — POLYETHYLENE GLYCOL 3350 17 G/17G
17 POWDER, FOR SOLUTION ORAL DAILY PRN
Status: DISCONTINUED | OUTPATIENT
Start: 2021-01-01 | End: 2021-01-01

## 2021-01-01 RX ORDER — ASPIRIN 325 MG
325 TABLET ORAL DAILY
Status: DISCONTINUED | OUTPATIENT
Start: 2021-01-01 | End: 2021-01-01

## 2021-01-01 RX ORDER — TRIAMTERENE AND HYDROCHLOROTHIAZIDE 37.5; 25 MG/1; MG/1
1 CAPSULE ORAL EVERY MORNING
Qty: 90 CAPSULE | Refills: 1 | Status: SHIPPED | OUTPATIENT
Start: 2021-01-01 | End: 2021-01-01

## 2021-01-01 RX ORDER — AMOXICILLIN AND CLAVULANATE POTASSIUM 875; 125 MG/1; MG/1
1 TABLET, FILM COATED ORAL 2 TIMES DAILY
Qty: 6 TABLET | Refills: 0 | Status: SHIPPED
Start: 2021-01-01 | End: 2021-01-01

## 2021-01-01 RX ORDER — PAROXETINE 10 MG/1
10 TABLET, FILM COATED ORAL EVERY MORNING
Qty: 30 TABLET | Refills: 3 | Status: SHIPPED | OUTPATIENT
Start: 2021-01-01 | End: 2021-01-01

## 2021-01-01 RX ORDER — FAMOTIDINE 20 MG/1
TABLET ORAL
Qty: 180 TABLET | Refills: 1 | Status: SHIPPED | OUTPATIENT
Start: 2021-01-01

## 2021-01-01 RX ORDER — FAMOTIDINE 20 MG/1
20 TABLET ORAL 2 TIMES DAILY
COMMUNITY
End: 2021-01-01

## 2021-01-01 RX ORDER — ENOXAPARIN SODIUM 100 MG/ML
30 INJECTION SUBCUTANEOUS DAILY
Status: DISCONTINUED | OUTPATIENT
Start: 2021-01-01 | End: 2021-01-01

## 2021-01-01 RX ORDER — LATANOPROST 50 UG/ML
1 SOLUTION/ DROPS OPHTHALMIC NIGHTLY
COMMUNITY

## 2021-01-01 RX ORDER — BUPROPION HYDROCHLORIDE 150 MG/1
TABLET ORAL
Qty: 30 TABLET | Refills: 3 | Status: SHIPPED | OUTPATIENT
Start: 2021-01-01

## 2021-01-01 RX ORDER — AMOXICILLIN AND CLAVULANATE POTASSIUM 875; 125 MG/1; MG/1
1 TABLET, FILM COATED ORAL 2 TIMES DAILY
Qty: 6 TABLET | Refills: 0 | Status: SHIPPED | OUTPATIENT
Start: 2021-01-01 | End: 2021-01-01

## 2021-01-01 RX ORDER — AMOXICILLIN AND CLAVULANATE POTASSIUM 875; 125 MG/1; MG/1
1 TABLET, FILM COATED ORAL 2 TIMES DAILY
Qty: 20 TABLET | Refills: 0 | Status: SHIPPED | OUTPATIENT
Start: 2021-01-01 | End: 2021-01-01

## 2021-01-01 RX ORDER — PRAVASTATIN SODIUM 10 MG
10 TABLET ORAL NIGHTLY
Status: DISCONTINUED | OUTPATIENT
Start: 2021-01-01 | End: 2021-01-01

## 2021-01-01 RX ORDER — PAROXETINE 10 MG/1
10 TABLET, FILM COATED ORAL EVERY MORNING
Status: DISCONTINUED | OUTPATIENT
Start: 2021-01-01 | End: 2021-01-01

## 2021-01-01 RX ORDER — LOSARTAN POTASSIUM 25 MG/1
25 TABLET ORAL DAILY
Status: DISCONTINUED | OUTPATIENT
Start: 2021-01-01 | End: 2021-01-01

## 2021-01-01 RX ORDER — BUPROPION HYDROCHLORIDE 150 MG/1
300 TABLET ORAL DAILY
Status: DISCONTINUED | OUTPATIENT
Start: 2021-01-01 | End: 2021-01-01

## 2021-01-01 RX ORDER — HYDRALAZINE HYDROCHLORIDE 20 MG/ML
10 INJECTION INTRAMUSCULAR; INTRAVENOUS EVERY 2 HOUR PRN
Status: DISCONTINUED | OUTPATIENT
Start: 2021-01-01 | End: 2021-01-01

## 2021-01-01 RX ORDER — SODIUM CHLORIDE 9 MG/ML
INJECTION, SOLUTION INTRAVENOUS CONTINUOUS
Status: ACTIVE | OUTPATIENT
Start: 2021-01-01 | End: 2021-01-01

## 2021-01-01 RX ORDER — ONDANSETRON 2 MG/ML
4 INJECTION INTRAMUSCULAR; INTRAVENOUS ONCE
Status: COMPLETED | OUTPATIENT
Start: 2021-01-01 | End: 2021-01-01

## 2021-01-01 RX ORDER — ACETAMINOPHEN 325 MG/1
650 TABLET ORAL EVERY 6 HOURS PRN
Status: DISCONTINUED | OUTPATIENT
Start: 2021-01-01 | End: 2021-01-01

## 2021-01-01 RX ORDER — CIPROFLOXACIN 250 MG/1
TABLET, FILM COATED ORAL
Qty: 14 TABLET | Refills: 0 | Status: SHIPPED | OUTPATIENT
Start: 2021-01-01 | End: 2021-01-01 | Stop reason: ALTCHOICE

## 2021-01-01 RX ORDER — SODIUM CHLORIDE, SODIUM LACTATE, POTASSIUM CHLORIDE, CALCIUM CHLORIDE 600; 310; 30; 20 MG/100ML; MG/100ML; MG/100ML; MG/100ML
INJECTION, SOLUTION INTRAVENOUS ONCE
Status: COMPLETED | OUTPATIENT
Start: 2021-01-01 | End: 2021-01-01

## 2021-01-01 RX ORDER — DORZOLAMIDE HYDROCHLORIDE AND TIMOLOL MALEATE 20; 5 MG/ML; MG/ML
1 SOLUTION/ DROPS OPHTHALMIC EVERY 12 HOURS SCHEDULED
Status: DISCONTINUED | OUTPATIENT
Start: 2021-01-01 | End: 2021-01-01

## 2021-01-01 RX ORDER — ASPIRIN 300 MG
300 SUPPOSITORY, RECTAL RECTAL DAILY
Status: DISCONTINUED | OUTPATIENT
Start: 2021-01-01 | End: 2021-01-01

## 2021-01-01 RX ORDER — ONDANSETRON 2 MG/ML
4 INJECTION INTRAMUSCULAR; INTRAVENOUS EVERY 6 HOURS PRN
Status: DISCONTINUED | OUTPATIENT
Start: 2021-01-01 | End: 2021-01-01

## 2021-01-01 RX ORDER — ASPIRIN 300 MG
300 SUPPOSITORY, RECTAL RECTAL ONCE
Status: COMPLETED | OUTPATIENT
Start: 2021-01-01 | End: 2021-01-01

## 2021-01-01 RX ORDER — PHENAZOPYRIDINE HYDROCHLORIDE 200 MG/1
TABLET, FILM COATED ORAL
Qty: 2 TABLET | Refills: 0 | Status: ON HOLD | OUTPATIENT
Start: 2021-01-01 | End: 2021-01-01

## 2021-01-01 RX ORDER — BISACODYL 10 MG
10 SUPPOSITORY, RECTAL RECTAL
Status: DISCONTINUED | OUTPATIENT
Start: 2021-01-01 | End: 2021-01-01

## 2021-01-01 RX ORDER — ACETAMINOPHEN 650 MG/1
650 SUPPOSITORY RECTAL EVERY 4 HOURS PRN
Status: DISCONTINUED | OUTPATIENT
Start: 2021-01-01 | End: 2021-01-01

## 2021-01-01 RX ORDER — BUPROPION HYDROCHLORIDE 150 MG/1
150 TABLET ORAL DAILY
Qty: 30 TABLET | Refills: 3 | Status: SHIPPED | OUTPATIENT
Start: 2021-01-01 | End: 2021-01-01

## 2021-01-01 RX ORDER — ONDANSETRON 2 MG/ML
4 INJECTION INTRAMUSCULAR; INTRAVENOUS ONCE
Status: DISCONTINUED | OUTPATIENT
Start: 2021-01-01 | End: 2021-01-01

## 2021-01-01 RX ORDER — BUPROPION HYDROCHLORIDE 150 MG/1
150 TABLET ORAL DAILY
Status: DISCONTINUED | OUTPATIENT
Start: 2021-01-01 | End: 2021-01-01

## 2021-01-01 RX ORDER — LABETALOL HYDROCHLORIDE 5 MG/ML
10 INJECTION, SOLUTION INTRAVENOUS ONCE
Status: COMPLETED | OUTPATIENT
Start: 2021-01-01 | End: 2021-01-01

## 2021-01-01 RX ORDER — METOCLOPRAMIDE HYDROCHLORIDE 5 MG/ML
5 INJECTION INTRAMUSCULAR; INTRAVENOUS EVERY 8 HOURS PRN
Status: DISCONTINUED | OUTPATIENT
Start: 2021-01-01 | End: 2021-01-01

## 2021-01-01 RX ORDER — HEPARIN SODIUM 5000 [USP'U]/ML
5000 INJECTION, SOLUTION INTRAVENOUS; SUBCUTANEOUS EVERY 12 HOURS SCHEDULED
Status: DISCONTINUED | OUTPATIENT
Start: 2021-01-01 | End: 2021-01-01

## 2021-01-15 NOTE — PATIENT INSTRUCTIONS
Start the Augmentin one twice a day for ten days. Call for abd cramping or lots of diarrhea. Also, go to ER for worsening pain, firm abdomen or vomiting.

## 2021-01-15 NOTE — PROGRESS NOTES
Patient presents with:  Abdominal Pain: symptom started approx. 4 days ago with mucus in the stools       HPI: Gwendolyn Becker is here for 4-day hx of left greater than right lower abdominal pain.  Always avoids peanuts but got a jar for [place] and helped herself Right rotator cuff tear arthropathy 8/1/2018   • Visual impairment     GLASSES       Patient Active Problem List:     Idiopathic scoliosis of lumbar spine     Hypercholesterolemia     Benign hypertension     Constipation     Right rotator cuff tear arthrop THE RIGHT EYE TWICE DAILY     • latanoprost 0.005 % Ophthalmic Solution INSTILL ONE DROP IN EACH EYE NIGHTLY     • hydrocortisone 2.5 % External Ointment Apply to AA of neck BID for 2 weeks, then decrease to Rncrcc-Wntzvvciw-Whticg for 2 weeks, then decrea

## 2021-01-16 PROBLEM — E88.49 MNGIE (MITOCHONDRIAL NEUROGASTROINTESTINAL ENCEPHALOPATHY SYNDROME) (HCC): Status: ACTIVE | Noted: 2021-01-01

## 2021-01-16 PROBLEM — R10.32 ABDOMINAL PAIN, LEFT LOWER QUADRANT: Status: ACTIVE | Noted: 2021-01-01

## 2021-01-16 PROBLEM — K57.92 DIVERTICULITIS: Status: ACTIVE | Noted: 2021-01-01

## 2021-01-17 NOTE — TELEPHONE ENCOUNTER
BRITTANY to [facility]: She is feeling better. The pain is less especially with walking and her BMs are normal.  She saw just a tinge of mucus at the end of her BM today. She is following a low residue diet. No fever, chills or sweats. Tolerating the Augmentin.

## 2021-01-18 PROBLEM — I63.9 ACUTE CVA (CEREBROVASCULAR ACCIDENT) (HCC): Status: ACTIVE | Noted: 2021-01-01

## 2021-01-18 PROBLEM — R73.9 HYPERGLYCEMIA: Status: ACTIVE | Noted: 2021-01-01

## 2021-01-18 NOTE — TELEPHONE ENCOUNTER
Alfred Valdovinos from [facility] notified me that Arie Reese has been sent via Ambulance to [facility].  He reports Vitals were WNL   Patient was dizzy. She also did not know where she was or who the president was.   Alfred Valdovinos reports that at first she refused to go but

## 2021-01-18 NOTE — ED INITIAL ASSESSMENT (HPI)
Pt arrives from assisted living facility with EMS with c/o nausea and generalized fatigue since this morning, incontinent of urine in transit to ER. Pt c/o abdominal pain.

## 2021-01-18 NOTE — TELEPHONE ENCOUNTER
PC from dtr [facility]. : Just talked to mother who sounds weak and speech is slurred. Thinks they should talk to me. Did not notify EMTs. PC to EMTs: Got voice mail    PC to Roge:  After taking shower got very weak.   Speech is slu

## 2021-01-18 NOTE — ED PROVIDER NOTES
Patient Seen in: [facility] Emergency Department      History   Patient presents with:  Nausea/Vomiting/Diarrhea  Fatigue  Abdomen/Flank Pain    Stated Complaint: nausea/fatigue    HPI/Subjective:   HPI    66-year-old female here for nausea fatigue. nausea and vomiting)     MOTION SICKNESS   • Renal disorder     DAMAGE D/T PRE-ECLAMPSIA  62 YRS AGO   • Right rotator cuff tear arthropathy 8/1/2018   • Visual impairment     GLASSES              Past Surgical History:   Procedure Laterality Date   • APPE neurologic exam there is mild confusion but no focal neurologic deficits.     ED Course     Labs Reviewed   COMP METABOLIC PANEL (14) - Abnormal; Notable for the following components:       Result Value    Glucose 136 (*)     BUN 23 (*)     Creatinine 1.54 POC Glucose 143 (*)     All other components within normal limits   POCT GLUCOSE - Abnormal; Notable for the following components:    POC Glucose 101 (*)     All other components within normal limits   CBC W/ DIFFERENTIAL - Abnormal; Notable for the follow DIFFERENTIAL WITH PLATELET    Narrative: The following orders were created for panel order CBC WITH DIFFERENTIAL WITH PLATELET.   Procedure                               Abnormality         Status                     --------- (CST): Manuela Alexander MD on 1/19/2021 at 7:33 AM     Finalized by (CST): Manuela Alexander MD on 1/19/2021 at 8:04 AM       Xr Lumbar Puncture Diag, Incld Img (cpt=62328)    Result Date: 1/20/2021  CONCLUSION:  Technically successful fluoroscopic guided lumbar punctu I63.9 1/18/2021 Unknown    Encephalitis G04.90 Unknown Unknown    Essential hypertension I10 9/1/2016 Unknown    Hyperglycemia R73.9 1/18/2021 Yes    Hyperlipidemia E78.5 9/1/2016 Unknown

## 2021-01-19 NOTE — PHYSICAL THERAPY NOTE
PT orders received and chart reviewed. RN confirmed neuro would like therapy to hold for today. Will follow and re-attempt as appropriate and able.

## 2021-01-19 NOTE — PROGRESS NOTES
NURSING ADMISSION NOTE      Received patient from ED @ 2100. Pt A/o x3. Started neuro q2. NIH 2.  Occasionally mild slurred speech and mild expressive aphasia. Denies any pain. [facility]   Safety precautions init

## 2021-01-19 NOTE — PROGRESS NOTES
[facility] Pharmacy Note:  Renal Dose Adjustment    Yessenia King has been prescribed famotidine (PEPCID) 20 mg orally every 12 hours. Estimated Creatinine Clearance: 25.8 mL/min (A) (based on SCr of 1.54 mg/dL (H)).     Calculated creatinine clearance is < 5

## 2021-01-19 NOTE — OCCUPATIONAL THERAPY NOTE
OCCUPATIONAL THERAPY                         OT order received, chart reviewed. Per RN, neurologist has not cleared patient for therapy evaluation today.  Will attempt tomorrow as appropriate

## 2021-01-19 NOTE — PROGRESS NOTES
[id number] [facility] Preliminary Note    Manual Ambar Patient Status:  Inpatient    1931 MRN [id number]   [facility] 7NE-A Attending Ace Sotelo MD   Hosp Day # 1 PCP Allison Rowell MD     REASON FOR CONSULTATION: pressure    • High cholesterol    • Hip bursitis, left 1/9/2014   • History of diverticulitis 7/1/2016   • HTN (hypertension)    • Hypercholesterolemia 9/1/2016   • Lumbar radiculopathy 4/18/2016   • Orthostatic hypotension 3/6/2020   • Osteoarthritis    • Comment:constipation    MEDICATIONS:  No current outpatient medications on file.       •  ondansetron HCl (ZOFRAN) injection 4 mg, 4 mg, Intravenous, Once    •  buPROPion HCl ER (XL) (WELLBUTRIN XL) 150 MG 24 hr tab 300 mg, 300 mg, Oral, Daily    •  Dorzola 12 in 3 dollars   Speech: fluent however has difficulty remembering the word she wants to say no dysarthria  Follows commands   Cranial Nerves   CN II, III: Visual fields full, Pupils right irregular - surgical, left 2 mm reactive   CN III, IV, VI: EOMI, n

## 2021-01-19 NOTE — CONSULTS
[id number] [facility] with [facility]  1/19/2021    11:55 AM      REASON FOR CONSULTATION:    Altered mental status     HISTORY OF PRESENT ILLNESS:    This is a 80year old female who presented for altered mental (hypertension)    • Hypercholesterolemia 9/1/2016   • Lumbar radiculopathy 4/18/2016   • Orthostatic hypotension 3/6/2020   • Osteoarthritis    • PONV (postoperative nausea and vomiting)     MOTION SICKNESS   • Renal disorder     DAMAGE D/T PRE-ECLAMPSIA mg, Intravenous, Once    •  buPROPion HCl ER (XL) (WELLBUTRIN XL) 150 MG 24 hr tab 300 mg, 300 mg, Oral, Daily    •  Dorzolamide HCl-Timolol Mal (Dorzolamide-Timolol) 22.3-6.8 MG/ML ophthalmic solution 1 drop, 1 drop, Both Eyes, Q12H    •  famoTIDine (PEPC Cranial Nerves   CN II, III: Visual fields full, Pupils right irregular - surgical, left 2 mm reactive   CN III, IV, VI: EOMI, no nystagmus  CN V: Facial sensation normal    CN VII: Symmetric facial movement   CN VIII: Normal hearing   CN IX, XI: uvula a

## 2021-01-19 NOTE — PROGRESS NOTES
Received pt at 2100 from ED  Pt AOx3, NSR, 2L NC, VSS  Per daughter, pt was more disoriented w/ slurred speech. Daughter said speech was normal once pt was admitted to floor. CTA (-), UA (-), CT of abd/plevis (-). MRI taken @0200.    Carotid artery doppl

## 2021-01-19 NOTE — CM/SW NOTE
Pt is a 81 yo female admitted for acute stroke initially. Pt was negative for CVA. Per RN, pt's  fell and supposedly pulled pt down with him. [facility] Pt has a dtr [facility] living in [address] [place] [address].   Pt zeinab

## 2021-01-19 NOTE — PROGRESS NOTES
[facility] Pharmacy Note:  Renal Adjustment for acyclovir (ZOVIRAX)    Sherry Hannon is a 80year old patient who has been prescribed acyclovir (ZOVIRAX) 700 mg (10 mg/kg actual body weight of 66 kg) every 8 hrs.   CrCl is estimated creatinine clearance is 29.2

## 2021-01-19 NOTE — PROGRESS NOTES
[facility] mobile number   [phone]    Called w/ update she wanted me to check w/ Dr Danilo Aviles re code status- left mess for Dr Danilo Aviles   Questions answered   EEG + sz started on keppra  Acyclovir started      Spoke w/ Dr gasca this afternoon- given update- s

## 2021-01-19 NOTE — DIETARY NOTE
[address]     Admitting diagnosis:  Acute CVA (cerebrovascular accident) ([facility] [address] [I63.9]    Ht:  4'11\"  Wt: 66 kg (145 lb 8.1 oz). This is 153 % of IBW  Body mass index is 29 kg/m².   IBW: 43.2kg    Labs/Meds rev

## 2021-01-19 NOTE — H&P
[facility]  History and Physical     Kennedi Choi Patient Status:  Inpatient    1931 MRN [id number]   [place] 7NE-A Attending Marciana Buerger [address] Day # 0 PCP Marco Go MD     Chief Complaint: AMS    Hi HTN (hypertension)    • Hypercholesterolemia 9/1/2016   • Lumbar radiculopathy 4/18/2016   • Orthostatic hypotension 3/6/2020   • Osteoarthritis    • PONV (postoperative nausea and vomiting)     MOTION SICKNESS   • Renal disorder     DAMAGE D/T PRE-ECLAMPS Comment:Hay fever  Toviaz                  OTHER (SEE COMMENTS)    Comment:constipation    Medications:  No current facility-administered medications on file prior to encounter. •  famoTIDine 20 MG Oral Tab, Take 20 mg by mouth 2 (two) times daily. Tejal Nevarez Regular rate and rhythm. No murmurs, rubs or gallops. Equal pulses. Chest and Back: No tenderness or deformity. Abdomen: Soft, nontender, nondistended. Positive bowel sounds. No rebound, guarding or organomegaly.   Neurologic: No focal neurological defi

## 2021-01-19 NOTE — PROCEDURES
[address]  in [place]  in affiliation with [facility]  [address]  [address]  [phone]  Fax [phone]  4/26/1931    Date of testing

## 2021-01-19 NOTE — SLP NOTE
ADULT SWALLOWING EVALUATION    ASSESSMENT    ASSESSMENT/OVERALL IMPRESSION:  Patient received alert in bed with RN present at bedside. Patient denied history of dysphagia including odynophagia and globus sensation.  She denied any recent history of pneumoni lumpectomy w/ radiation   • Cancer ([facility] [address] ~2006    LEFT BREAST LUMPECTOMY /RADIATION    • Depression    • Diverticulitis 2016   • Diverticulitis of colon 11/16/2006   • Diverticulitis of large intestine without perforation or abscess without bleeding 10/1/2 1/19/2021 at 8:04 AM     SUBJECTIVE       OBJECTIVE   ORAL MOTOR EXAMINATION  Dentition: Natural  Symmetry: Within Functional Limits  Strength:  Within Functional Limits  Tone: Within Functional Limits  Range of Motion: Within Functional Limits       Voice

## 2021-01-19 NOTE — PROGRESS NOTES
[facility] H[facility]PITAL[facility]  Progress Note     [facility] Patient Status:  Inpatient    1931 MRN [id number]   [facility] 7NE-A Attending Francine Melgoza MD   Hosp Day # 1 PCP Mio Addison MD     Chief Complaint: AMS  S:   C/o mild abd difficulties and confusion  1. Consult neurology  2. MRI CONCLUSION:   1. No acute infarct.   2. Abnormal signal within the medial left temporal lobe which appears enlarged concerning for nflammatory/infectious process, correlate clinically with herpes enc RDW 12.7 12.8   NEPRELIM 7.13 5.04   WBC 9.2 6.2   .0 250.0       Recent Labs   Lab 01/18/21  1527 01/19/21  0518 01/19/21  0637   * 110*  --    BUN 23* 26*  --    CREATSERUM 1.54* 1.36*  --    GFRAA 34* 40*  --    GFRNAA 30* 35*  --    CA

## 2021-01-20 NOTE — PLAN OF CARE
Received pt at 1930  Pt AOx3, NSR, 2L NC, VSS  Lumbar puncture 1/20 AM, pt NPO @ 0000. DNAR/ Select status 1/19, POLST needs to be completed. PRN Zofran given @0200 for abd discomfort and nausea.   D/c Planning: Possible PT/OT eval 1/20  Call light withi

## 2021-01-20 NOTE — PROGRESS NOTES
[facility] H[facility]PITALIST  Progress Note     Debbie Paidlla Patient Status:  Inpatient    1931 MRN [id number]   [facility] 7NE-A Attending Yamilka Kwan MD   Hosp Day # 2 PCP Rommel Macdonald MD     Chief Complaint: AMS  S:   No issues overnoc 1. Suspected encephalitis  ? Poss HSV   1. Word finding difficulties and confusion less   2.  neurology following   3. MRI CONCLUSION:   1. No acute infarct.   2. Abnormal signal within the medial left temporal lobe which appears enlarged concerning fo

## 2021-01-20 NOTE — OCCUPATIONAL THERAPY NOTE
OCCUPATIONAL THERAPY            Attempted OT evaluation. The patient is having a lumbar puncture and will then need to be on flat bedrest.  Will follow up when appropriate.

## 2021-01-20 NOTE — PROCEDURES
[facility]  Procedure Note    Ira Gant Patient Status:  Inpatient    1931 MRN [id number]   [facility] 7NE-A Attending Kelvin Murphy MD   Hosp Day # 2 PCP Ally Rob MD     Procedure: Edwige Horner guided LP    Pre-Procedure

## 2021-01-20 NOTE — PROGRESS NOTES
[id number] [facility] Neurology Progress Note    Con Jose Patient Status:  Inpatient    1931 MRN [id number]   [facility] 7NE-A Attending Jose Vences MD   Hosp Day # 2 PCP Kiki Braxton MD     NEUROLOGY   Attending Addendum Maile Cole Collection Time: 01/20/21  6:26 AM   Result Value Ref Range    Glucose 86 70 - 99 mg/dL    Sodium 139 136 - 145 mmol/L    Potassium 4.1 3.5 - 5.1 mmol/L    Chloride 109 98 - 112 mmol/L    CO2 27.0 21.0 - 32.0 mmol/L    Anion Gap 3 0 - 18 mmol/L    BUN 29 ( 11:05 AM   Result Value Ref Range    Total Protein CSF 49.0 (H) 15.0 - 45.0 mg/dL   GLUCOSE, CEREBROSPINAL FLUID    Collection Time: 01/20/21 11:05 AM   Result Value Ref Range    Glucose CSF 59 40 - 70 mg/dL   CSF CULTURE    Collection Time: 01/20/21 11:05 documented above    High risk    (   ) Drug monitoring    (   ) PCA    (   ) Organ failure    (   ) De-escalation of treatment - DNR    (   ) Acute neurologic changes    (   ) Others: New Rx    (   ) ICU >35 minutes total time   Available within moments ca fronto-temporal and presence of sharp and spike-slow wave discharge over the left frontal temporal region consistent with focal seizures    MRI brain: CONCLUSION:    1. No acute infarct.   2. Abnormal signal within the medial left temporal lobe which appear BID  Empiric Acyclovir started  DVT prophylaxis   Plan for LP today     Memory and word recall improved, keppra started yesterday for focal seizures on  EEG, appears to be tolerating keppra  LP scheduled for today      FEROZ Lawson

## 2021-01-21 NOTE — PLAN OF CARE
Assumed care at 1930  Pt NSR/SB on tele RA  Pt A/O x3  No acute changes overnight  Needs met will continue monitor            Problem: SAFETY ADULT - FALL  Goal: Free from fall injury  Description: INTERVENTIONS:  - Assess pt frequently for physical needs

## 2021-01-21 NOTE — CM/SW NOTE
Spoke to patients daughter re: PT recommendation for NICOLASA. Patient is from [facility]. Daughter is ok with referral to [facility]. Referral sent via A[facility]. DON called. Await medical clearance.

## 2021-01-21 NOTE — PROGRESS NOTES
[address]  1/21/2021   4:01 PM    Patient seen and examined.     Problems:  Transient episode of speech problem and MRI showing what looks line left temporal encephalitis      Developments the past 24 hours:  No recurrence RBC CSF      /mm3 626   Neutrophils CSF      % 46   Lymphocytes CSF      % 39   Mono/Macrophages CSF      % 15   EOSINOPHILS CSF      % 0   Basophil CSF      % 0   TOTAL CELLS COUNTED       100   Total Protein CSF      15.0 - 45.0 mg/dL 49.0 (H)   Glucos

## 2021-01-21 NOTE — PHYSICAL THERAPY NOTE
PHYSICAL THERAPY EVALUATION - INPATIENT     Room Number: 0808/0676-D  Evaluation Date: 1/21/2021  Type of Evaluation: Initial  Physician Order: PT Eval and Treat    Presenting Problem: seizures, concern for encephalitis  Reason for Therapy: Mobility • Depression    • Diverticulitis 2016   • Diverticulitis of colon 11/16/2006   • Diverticulitis of large intestine without perforation or abscess without bleeding 10/1/2018   • Diverticulosis of large intestine 8/28/2017   • Dyslipidemia 2016   • Exposur and assists her spouse to remember to take medications. Pt reports that her and her  walk to the dining room daily and have no assistance for ADLs from staff. SUBJECTIVE  \"I do not like how I feel, these meds are too much right now.  I feel fuzz activity, c/o dizziness throughout session. Vitals stable.         BP: 160/76  BP Location: Right arm  BP Method: Automatic  Patient Position: Sitting    O2 WALK                  AM-PAC '6-Clicks' INPATIENT SHORT FORM - BASIC MOBILITY  How much difficulty d use of RW, min assist that progressed to CGA, and pt demonstrated significantly safer and steadier gait. Ended session with pt in chair, alarm set, and all questions answered.        Exercise/Education Provided:  Bed mobility  Energy conservation  Functiona to/from Stand at assistance level: independent     Goal #3 Patient is able to ambulate 150 feet with assist device: none at assistance level: independent     Goal #4    Goal #5    Goal #6    Goal Comments: Goals established on 1/21/2021    PT/OT wore PPE:

## 2021-01-21 NOTE — PLAN OF CARE
Assumed care of patient at 07:15 am  Pt A/O x 3. VSS. Lumbar puncture completed, patient stable throughout shift. Patient and daughter updated on POC. Call light within reach.    Will continue to monitor    Problem: Patient/Family Goals  Goal: Patien Interventions:  - Patient should be alert and upright for all feedings (90 degrees preferred)  - Offer food and liquids at a slow rate  - No straws  - Encourage small bites of food and small sips of liquid  - Offer pills one at a time, crush or deliver wit system  Outcome: Progressing

## 2021-01-21 NOTE — OCCUPATIONAL THERAPY NOTE
OCCUPATIONAL THERAPY EVALUATION - INPATIENT     Room Number: 9489/0053-O  Evaluation Date: 1/21/2021  Type of Evaluation: Initial  Presenting Problem: (encephalitis, seizures)    Physician Order: IP Consult to Occupational Therapy  Reason for Therapy: ADL/ Benign hypertension 9/1/2016   • Breast CA ([facility] [address]     dx in her 60's left lumpectomy w/ radiation   • Cancer ([facility] [address] ~2006    LEFT BREAST LUMPECTOMY /RADIATION    • Depression    • Diverticulitis 2016   • Diverticulitis of colon 11/16/2006   • Diverticulitis Lives at [facility] with her spouse who has some dementia and balance issues per patient. Patient is independent for ADLs and mobility without a device and assists her spouse to remember to take medications.   Patient reports on questioning that the BUEs    COORDINATION  Gross Motor    WFL    Fine Motor    WFL      ADDITIONAL TESTS                                    NEUROLOGICAL FINDINGS  Neurological Findings: Coordination - Finger Opposition        Coordination - Finger Opposition: Symmetrical needed. Patient left in chair with alarm on , needs met, oriented to call light and instructed to call for staff to get up from the chair. Patient End of Session: Up in chair;Needs met;Call light within reach;RN aware of session/findings; All patient ques treatment options    Overall Complexity MODERATE     OT Discharge Recommendations: Sub-acute rehabilitation  OT Device Recommendations: TBD    PLAN  OT Treatment Plan: Balance activities; Energy conservation/work simplification techniques;ADL training;Funct

## 2021-01-21 NOTE — PROGRESS NOTES
NAKITA HOSPITALIST  Progress Note     Manual [facility] Patient Status:  Inpatient    1931 MRN [id number]   [facility] 7NE-A Attending Katarina Cintron MD   Hosp Day # 3 PCP Allison Rowell MD     Chief Complaint: AMS  S:   C/o feeling  [employer] 1.  neurology following   2. MRI CONCLUSION:   1. No acute infarct. 2. Abnormal signal within the medial left temporal lobe which appears enlarged concerning for inflammatory/infectious process, correlate clinically with herpes encephalitis.      4. S/p

## 2021-01-21 NOTE — CONSULTS
INFECTIOUS DISEASE CONSULTATION    Archie Christian Patient Status:  Inpatient    1931 MRN [id number]   [facility] 7NE-A Attending Adebayo Omer MD   [facility] Day # 3 PCP Garrett Cruz, impairment     GLASSES     Past Surgical History:   Procedure Laterality Date   • APPENDECTOMY  1957   • ENDOSCOPIC ULTRASOUND (EUS) N/A 9/2/2016    Performed by Donavan Oviedo MD at [facility] ENDOSCOPY   • ENDOSCOPIC ULTRASOUND EXAM  9/16    normal GB   • E (Order-Specific), Intravenous, Q12H  •  ondansetron HCl (ZOFRAN) injection 4 mg, 4 mg, Intravenous, Once  •  buPROPion HCl ER (XL) (WELLBUTRIN XL) 150 MG 24 hr tab 300 mg, 300 mg, Oral, Daily  •  Dorzolamide HCl-Timolol Mal (Dorzolamide-Timolol) 22.3-6.8 M mouth daily. , Disp: 90 tablet, Rfl: 3    •  hydrochlorothiazide 12.5 MG Oral Tab, Take 1 tablet (12.5 mg total) by mouth daily. , Disp: 90 tablet, Rfl: 3    •  hydrocortisone 2.5 % External Ointment, Apply to AA of neck BID for 2 weeks, then decrease to [facility] Collection Time: 01/20/21 11:05 AM    Specimen: CSF, lumbar puncture; Cerebral spinal fluid   Result Value Ref Range    CSF Culture Result No Growth at 18-24 hrs.  N/A    CSF Smear 1+ WBCs seen N/A    CSF Smear No organisms seen N/A    CSF Smear This is a c

## 2021-01-21 NOTE — SLP NOTE
SPEECH/LANGUAGE/COGNITIVE EVALUATION - INPATIENT    Admission Date: 1/18/2021  Evaluation Date: 01/21/21    Reason for Referral: RN dysphagia screen    ASSESSMENT & PLAN   ASSESSMENT & IMPRESSION  Patient is an 81 y/o female know to this service for prior Assistance/Support for ADL's        Interdisciplinary Communication: Discussed with RN    Patient, family and/or caregiver has been informed and has taken part in this evaluation and plan of treatment and have been advised and agree on the findings and goa

## 2021-01-22 NOTE — PROGRESS NOTES
NAKITA HOSPITALIST  Progress Note     Con Jose Patient Status:  Inpatient    1931 MRN [id number]   [facility] 7NE-A Attending Jose Vences MD   Hosp Day # 4 PCP Kiki Braxton MD     Chief Complaint: encephalitis    S: Patien <0.045   PBNP 355  351       Creatinine Kinase  No results for input(s): CK in the last 168 hours. Inflammatory Markers  Recent Labs   Lab 01/18/21  1527   DDIMER 0.67       Imaging: Imaging data reviewed in Epic.     Medications:   • levETIRAcetam  500

## 2021-01-22 NOTE — PROGRESS NOTES
[facility] Note:  Renal Dose Adjustment for Enoxaparin (LOVENOX)    Joan Chaudhry has been prescribed Enoxaparin (LOVENOX) 40 mg subcutaneously every 24 hours. Estimated Creatinine Clearance: 27.4 mL/min (A) (based on SCr of 1.45 mg/dL (H)).     Calc

## 2021-01-22 NOTE — PROGRESS NOTES
[id number] [facility] Neurology Progress Note    Stoney Carreno Patient Status:  Inpatient    1931 MRN [id number]   [facility] 7NE-A Attending Abhi Ogden MD   [facility] Day # 4 PCP Ileana Blandon MD     Subjective:  Stoney Ev is Antigen Negative  Culture   No Growth after 2 days    Component      Latest Ref Rng & Units 1/20/2021   Neutrophils CSF      % 46   Lymphocytes CSF      % 39   Mono/Macrophages CSF      % 15   EOSINOPHILS CSF      % 0   Basophil CSF      % 0   TOTAL CELLS with Dr. Leda Lora. Planning to dc to rehab possibly tomorrow.      JOSE Penn  [place] [facility] [id number]  Pager [phone]  1/22/2021, 2:15 PM      Neurology Attending Addendum:  I have seen independently, reviewed history, labs and im imaging; prior imaging as noted above    Labs:   BMP:   No results found for: GLUCOSE  Lab Results   Component Value Date    K 3.7 01/22/2021    K 4.1 01/20/2021    K 3.8 01/19/2021     Lab Results   Component Value Date    BUN 23 (H) 01/22/2021    BUN 29

## 2021-01-22 NOTE — PLAN OF CARE
A/OX3, awake and alert but very forgetful  RA, VSS, NSR per Tele  Denies pain at this time  PT/OT- recommending NICOLASA  SLT- ok w/ regular diet, up to chair for meals  Seizure/ Fall precautions in place  Daughter Lucretia Held updated on POC  Needs attended to, W toileting schedule  Outcome: Progressing

## 2021-01-22 NOTE — PROGRESS NOTES
[facility]                INFECTIOUS DISEASE PROGRESS NOTE    Chanell Smith Patient Status:  Inpatient    1931 MRN [id number]   [facility] 7NE-A Attending Riccardo Cortez MD   Hosp Day # 4 PCP Agusto Ramirez MD     Antibiot 107  --   --   --   --    AST 17  --   --   --   --    ALT 22  --   --   --   --    BILT 0.3  --   --   --   --    TP 7.0  --   --   --   --        No results found for: [facility] Encounter on 01/18/21   1.  CSF CULTURE     Status: Non

## 2021-01-22 NOTE — PLAN OF CARE
Patient slightly disoriented this morning; initially thought it was Gail   Alert and oriented x3  Difficulty with short term memory; easily reoriented   Telemetry, Sinus Rhythm   Denies pain/discomfort   LP site with band-aid dry and intact  UP to chair to INTERVENTIONS  - Monitor swallowing and airway patency with patient fatigue and changes in neurological status  - Encourage and assist patient to increase activity and self care with guidance from PT/OT  - Encourage visually impaired, hearing impaired and assist at discharge as needed  - Consider post-discharge preferences of patient/family/discharge partner  - Complete POLST form as appropriate  - Assess patient's ability to be responsible for managing their own health  - Refer to Case Management Jasmin Grover

## 2021-01-22 NOTE — PHYSICAL THERAPY NOTE
PHYSICAL THERAPY TREATMENT NOTE - INPATIENT    Room Number: 6407/9848-G     Session: 1   Number of Visits to Meet Established Goals: 5    Presenting Problem: seizures, concern for encephalitis    History related to current admission:   Admitted 1/18/21 fr of large intestine without perforation or abscess without bleeding 10/1/2018   • Diverticulosis of large intestine 8/28/2017   • Dyslipidemia 2016   • Exposure to radiation    • Fecal incontinence     TEMPORARY   • Glaucoma     RIGHT   • Hearing impairment Standing: Fair -  Dynamic Standing: Fair -    ACTIVITY TOLERANCE   Pt with decreased tolerance to activity. Vitals stable.                        O2 WALK                    AM-PAC '6-Clicks' INPATIENT SHORT FORM - BASIC MOBILITY  How much difficulty does th demonstrate any lean or LOB. Pt completed seated exercises with cues for technique. Pt with increased fatigue and requested to return to bed. Pt performed sit so supine transfer with min assist with HOB flat.  Ended session with pt in bed, alarm set, and al

## 2021-01-22 NOTE — CM/SW NOTE
The [address] accepted pt for NICOLASA. Pt may need IV ABX at dc for possible encephalitis - waiting for cultures to come back.

## 2021-01-22 NOTE — PLAN OF CARE
Assumed care at [address]. Alert and oriented x3, forgetful. Telemetry ,monitor reading SR. IVF infusing assessed and maintained. ABX given. No pain. Fall precautions maintained per protocol.  Plan awaiting LP cultures and NICOLASA placement, referral sent to [facility] Verified patient identity with two identifiers. Spoke with patient by phone. INR 2.2 via home monitor this week. Patient instructions:   Continue Coumadin 6mg daily   Recheck in 2 weeks    A full discussion of the nature of anticoagulants has been carried out. A benefit risk analysis has been presented to the patient, so that they understand the justification for choosing anticoagulation at this time. The need for frequent and regular monitoring, precise dosage adjustment and compliance is stressed. Side effects of potential bleeding are discussed. The patient should avoid any OTC items containing aspirin or ibuprofen, and should avoid great swings in general diet. Avoid alcohol consumption. Call if any signs of abnormal bleeding. Patient verbalized understanding.

## 2021-01-23 NOTE — PLAN OF CARE
Assumed care at [facility], A/Ox3, forgetful and needs frequent re-orientation as to why she is here. R/A, NSR on tele. Mixed continence w/pure wick in place. Pills whole w/water. . Lex@[employer]. Reg diet. Q6 accu. LP site CDI. Cultures pending, no growth so far.  Corona devices  - Allow adequate time for verbal responses.   Outcome: Progressing     Problem: Impaired Swallowing  Goal: Minimize aspiration risk  Description: Interventions:  - Patient should be alert and upright for all feedings (90 degrees preferred)  - Offer

## 2021-01-23 NOTE — PROGRESS NOTES
[facility]                INFECTIOUS DISEASE PROGRESS NOTE    Sherry Hannon Patient Status:  Inpatient    1931 MRN [id number]   [facility] 7NE-A Attending Dean Ferris MD   Hosp Day # 5 PCP Merary Johnson MD     Antibiot 111  --    CO2 26.0 24.0  --  27.0 28.0  --    ALKPHO 107  --   --   --   --   --    AST 17  --   --   --   --   --    ALT 22  --   --   --   --   --    BILT 0.3  --   --   --   --   --    TP 7.0  --   --   --   --   --     < > = values in this interval no

## 2021-01-23 NOTE — PROGRESS NOTES
Assumed care at [address] [place] [address] Road. Patient A&Ox3 with short term memory loss, easily reoriented. Tele SR, on room air. Denies pain. IV acyclovir as ordered. Plan for the [facility] [address] at discharge. Will cont to monitor.

## 2021-01-23 NOTE — PROGRESS NOTES
NAKITA HOSPITALIST  Progress Note     Cayden Lagunas Patient Status:  Inpatient    1931 MRN [id number]   [facility] 7NE-A Attending Aliya Kennedy MD   Hosp Day # 5 PCP Giorgi Gonzales MD     Chief Complaint: encephalitis    S: Patien COVID-19 Lab Results    COVID-19  Lab Results   Component Value Date    COVID19 Not Detected 01/18/2021       Pro-Calcitonin  No results for input(s): PCT in the last 168 hours.     Cardiac  Recent Labs   Lab 01/18/21  1527   TROP <0.045   PBNP 355  351

## 2021-01-23 NOTE — PROGRESS NOTES
[id number] [facility] Neurology Progress Note    Christos Combs Patient Status:  Inpatient    1931 MRN [id number]   [facility] 7NE-A Attending Eileen Ac MD   [facility] Day # 5 PCP Jhonathan Mcdowell MD     Subjective:  Christos Combs is or dysmetria  Romberg: deferred  Gait: deferred    Imaging:  No new dedicated neuro imaging    Prior as noted below    MRI Brain 1/19/2021  1. No acute infarct.   2. Abnormal signal within the medial left temporal lobe which appears enlarged concerning for 03/11/2020     Lab Results   Component Value Date    ALT 22 01/18/2021    ALT 18 12/01/2020    ALT 19 03/11/2020       CSF Studies  Cytology fluids  Negative  Path   Predominantly red blood cells with few scattered histiocytes, lymphocytes and neutrophils.

## 2021-01-24 NOTE — PROGRESS NOTES
[id number] Kelley Penalo[facility] Neurology Progress Note    Arlin Mace Patient Status:  Inpatient    1931 MRN [id number]   [facility] 7NE-A Attending Lacie Garcia MD   [facility] Day # 6 PCP Cristian Rubio MD         Subjective:  Arlin Mace Oral Nightly   • latanoprost  1 drop Both Eyes Nightly   • Pravastatin Sodium  10 mg Oral Nightly   • losartan Potassium  50 mg Oral Daily       Patient Active Problem List:     Idiopathic scoliosis of lumbar spine     Hyperlipidemia     Essential hyperten cx - negative for 3 days    Assessment/Plan:  Encephalitis: left medial temporal FLAIR hyperintensity noted, suggestive of encephalitis   Abnormal EEG       Plan:  Seizure precautions  Cont keppra 500 mg BID  Cont acyclovir   ID following   CSF WNV, HSV pe touch throughout   Coord: FNF intact with no tremor or dysmetria  Romberg: deferred  Gait: deferred    Imaging: no new imaging; prior imaging as noted above    Labs:   BMP:   No results found for: GLUCOSE  Lab Results   Component Value Date    K 3.7 01/24/ view      Miki Mcbride MD, Neurology  [facility]  Pager [phone]  1/24/2021

## 2021-01-24 NOTE — CM/SW NOTE
Per chidi in admissions at the [facility], patient assigned to room 126. Nurse to call report to .   [facility]  arranged for 1500  [facility] form completed)

## 2021-01-24 NOTE — PROGRESS NOTES
[facility]                INFECTIOUS DISEASE PROGRESS NOTE    Bulmaro Jacobs Patient Status:  Inpatient    1931 MRN [id number]   [facility] 7NE-A Attending Yarelis Jacobs MD   Hosp Day # 6 PCP Analisa Dunn MD     Antibiot < > 109 111  --  113*   CO2 26.0   < > 27.0 28.0  --  25.0   ALKPHO 107  --   --   --   --   --    AST 17  --   --   --   --   --    ALT 22  --   --   --   --   --    BILT 0.3  --   --   --   --   --    TP 7.0  --   --   --   --   --     < > = values in t

## 2021-01-24 NOTE — PLAN OF CARE
NURSING DISCHARGE NOTE    Discharged Rehab facility via Ambulance.   Accompanied by Support staff  Belongings Taken by patient/family     Received patient A/Ox4, forgetful, RA, NSR on tele at 0700  Isolation precautions maintained  Seizure precautions m functionality and self care  Description: INTERVENTIONS  - Monitor swallowing and airway patency with patient fatigue and changes in neurological status  - Encourage and assist patient to increase activity and self care with guidance from PT/OT  - Encourag learning needs (meds, wound care, etc)  - Arrange for interpreters to assist at discharge as needed  - Consider post-discharge preferences of patient/family/discharge partner  - Complete POLST form as appropriate  - Assess patient's ability to be responsib

## 2021-01-24 NOTE — PLAN OF CARE
Assumed care at [facility] and oriented x4, forgetful  Seizure precautions in place  IVF infusing  On RA, spO2 90's  LP site c/d/I  Plan is for IV acyclovir, poss dc tomorrow  Discussed with patient & POA over the phone  Call light in reach          [facility] Problem: Impaired Swallowing  Goal: Minimize aspiration risk  Description: Interventions:  - Patient should be alert and upright for all feedings (90 degrees preferred)  - Offer food and liquids at a slow rate  - No straws  - Encourage small bites of matthew related to functional status, cognitive ability or social support system  Outcome: Progressing

## 2021-01-25 PROBLEM — F32.9 REACTIVE DEPRESSION: Status: ACTIVE | Noted: 2021-01-01

## 2021-01-25 PROBLEM — G04.90 ENCEPHALITIS: Status: ACTIVE | Noted: 2021-01-01

## 2021-01-25 PROBLEM — R53.1 WEAKNESS: Status: ACTIVE | Noted: 2021-01-01

## 2021-01-25 PROBLEM — R94.01 ABNORMAL EEG: Status: ACTIVE | Noted: 2021-01-01

## 2021-01-25 PROBLEM — F09 MILD COGNITIVE DISORDER: Status: ACTIVE | Noted: 2021-01-01

## 2021-01-25 NOTE — PROGRESS NOTES
Facility called requesting assistance       Anisha Shukla, Christopher Billingsley, [address]  [phone]  Appt scheduled for Wed Feb 10th @8:00am

## 2021-01-25 NOTE — DISCHARGE SUMMARY
[facility]  DISCHARGE SUMMARY     Christos Combs Patient Status:  Inpatient    1931 MRN [id number]   [facility] 7NE-A Attending No att. providers found   [address] Day # 6 PCP Jhonathan Mcdowell MD     Date of Admission: 2021  Gaurav Medication List:     Discharge Medications      START taking these medications      Instructions Prescription details   levETIRAcetam 500 MG Tabs  Commonly known as: KEPPRA      Take 1 tablet (500 mg total) by mouth 2 (two) times daily.    Quantity: 60 tabl - [address], [address], [address] [address]    Phone: [phone]   · valACYclovir HCl 500 MG Tabs     Please  your prescriptions at the location directed by your doctor or nurse    Bring a paper prescription f

## 2021-01-26 NOTE — TELEPHONE ENCOUNTER
PC to pt: Advised that I talked to dtr about discharge on Friday and she would like a Sunday discharge as they are arriving late Friday and need a day to get settled. They would like a Sunday discharge.  I advised that barring any complications  Chevy Castro i

## 2021-01-26 NOTE — TELEPHONE ENCOUNTER
PC to dtr, Lucretia Muro. Advised of my assessment of her mother at the [address] [place] [address]. She is doing well but anxious to return home to [facility] and be with her . She is tearful for several reasons.         Dtr is arriving here on Friday from [place] and mother

## 2021-01-26 NOTE — TELEPHONE ENCOUNTER
PC to dtr, Yenni Larsen. Informed that mother's HSV test is negative. Will talk to neurology abou stopping the Valtrex. Dtr feels mother is a bit manic.  She is talking quickly about returning home and all the things she wants/has to do and how excited

## 2021-01-26 NOTE — PROGRESS NOTES
Chief complaint:  Admitting history and physical for NICOLASA at the [facility]. HPI: Here to see Octavia Tijerina for NICOLASA after hospitalization for weakness, dizziness, slurred speech, confusion and MRI finding of infection/inflammation of left temporal lobe.     Roseanna LEFT BREAST LUMPECTOMY /RADIATION    • Depression    • Diverticulitis 2016   • Diverticulitis of colon 11/16/2006   • Diverticulitis of large intestine without perforation or abscess without bleeding 10/1/2018   • Diverticulosis of large intestine 8/28/ Oral Tab Take 20 mg by mouth 2 (two) times daily. • Pravastatin Sodium 10 MG Oral Tab Take 10 mg by mouth nightly. • latanoprost 0.005 % Ophthalmic Solution Place 1 drop into both eyes nightly.      • Dorzolamide HCl-Timolol Mal 22.3-6.8 MG/ML Ophth 136 - 145 mmol/L 141    Potassium   3.5 - 5.1 mmol/L 3.8    Chloride   98 - 112 mmol/L 113High     CO2   21.0 - 32.0 mmol/L 24.0    Anion Gap   0 - 18 mmol/L 4    BUN   7 - 18 mg/dL 22High     Creatinine   0.55 - 1.02 mg/dL 1. 49High     BUN/CREA Ratio

## 2021-01-29 NOTE — PROGRESS NOTES
Chief complaint: Follow-up of memory loss     HPI: Here to see Olena Marilynmal in follow-up for temporal lobe infection/inflammation of unknown etiology, new onset short term memory loss. All cultures have been negative. Presdisposed too seizures:   On Levetir radiation    • Fecal incontinence     TEMPORARY   • Glaucoma     RIGHT   • Hearing impairment    • Hearing loss    • Hepatitis, unspecified     INDUCED BY LIPITOR   • High blood pressure    • High cholesterol    • Hip bursitis, left 1/9/2014   • History of Ophthalmic Solution Place 1 drop into both eyes every 12 (twelve) hours. • buPROPion HCl ER, XL, 300 MG Oral Tablet 24 Hr Take 1 tablet (300 mg total) by mouth daily.  90 tablet 3   • losartan Potassium 50 MG Oral Tab Take 1 tablet (50 mg total) by mout

## 2021-01-29 NOTE — TELEPHONE ENCOUNTER
PC to [facility]. Relayed my impression of mother after visit today. She is doing well per PT, S&L and nurses. She will be ready to be discharged on Monday. Pt states she knows will improve once she is back at e in .       She is upset that her h

## 2021-02-02 NOTE — TELEPHONE ENCOUNTER
Patient's son in law was with her, Brooke Daily allowed me to reiterate this message to him.   Her daughter was also in the apartment but she was teaching a class remotely

## 2021-02-02 NOTE — TELEPHONE ENCOUNTER
----- Message from Ev Rea MD sent at 2/2/2021  2:40 PM CST -----  Thanks for the UA. Let' start Cipro 250 mg bid for 7 days.   Can also add Pyridium 200 mg one today and one in am.  #2.

## 2021-02-02 NOTE — TELEPHONE ENCOUNTER
Contacted patient per Dr Twan Ibarra request.  Messi Handler patient to have her daughter to come down to  UA specimen cup and return sample to us for testing.

## 2021-02-02 NOTE — TELEPHONE ENCOUNTER
She will be receiving Nursing, PT, OT, ST    They are aware that patient has an appointment with MD on 2/3/21

## 2021-02-03 PROBLEM — F09 COGNITIVE DYSFUNCTION: Status: ACTIVE | Noted: 2021-01-01

## 2021-02-03 NOTE — PROGRESS NOTES
Patient presents with:  Hospital F/U: patient was in the hospital on 01/18/2021 for CVA       HPI: Isra Adler is here in follow up for episode of dizziness and weakness found to have focus of inflammation/infection in left temporal lobe.  Developed short-term mood, decreased appetite and disturbed sleep. Having trouble with short-term memory and upset by loss of control.           ROS:   Const: No fever, chills, sweats  HEENT:  Pul: No SOB, cough, wheezing  Heart: No chest pain, palpitations  GI: No abd pain, di hypertension     Constipation     Urge incontinence     Stage 3 chronic kidney disease     IPMN (intraductal papillary mucinous neoplasm)     Macular degeneration, right eye     Osteoarthritis of knee     Open-angle glaucoma     Generalized anxiety disorde Ggxtlz-Dstblvhtu-Binksu for 2 weeks, then decrease to Tuesday-Thursday for 2 weeks, continue to decrease then stop.  20 g 2       PFHSH:   Family History   Problem Relation Age of Onset   • Heart Disorder Father          age 80   • Heart Disorder Mother procedures, referrals, documenting, interpreting results and educating pt on plan: 60 minutes    Antonino Love MD

## 2021-02-04 NOTE — PATIENT INSTRUCTIONS
Patient presents with:  Hospital F/U: patient was in the hospital on 01/18/2021 for CVA      HPI: 4 dx or tx plans or exten data review (4) AND PE 8 organ systems OR HPI 4, ROS 10, PFSH (2)         Past Medical History:   Diagnosis Date   • Abdominal aorti degeneration, right eye     Osteoarthritis of knee     Open-angle glaucoma     Generalized anxiety disorder     Osteopenia of spine     Ankle swelling     Generalized abdominal pain, chronic, w/u negative     Gallbladder sludge     Hyperglycemia     Reacti sore throat, nasal congestion, rhinitis, ear ache  Neck: No pain, swollen glands, thyroid enlargement  Lungs: No SOB, cough, wheezing, sputum production  Cor: No chest pain, palpitations  Abd: No abd pain, nausea, diarrhea, constipation, GERD.   : No urin

## 2021-02-08 NOTE — TELEPHONE ENCOUNTER
Once a week for 3 more week. V/O okayed. She will be having PT once a week for 8 more weeks.     OT states daughter and son-in-law due to leave at the end of the week

## 2021-02-10 PROBLEM — H02.421 MYOGENIC PTOSIS OF RIGHT EYELID: Status: ACTIVE | Noted: 2021-01-01

## 2021-02-10 NOTE — PATIENT INSTRUCTIONS
Start the Paroxetine (Paxil) 10 mg obnce a day in the morning. You have to start a new prescription of Wellbutrin  mg (from 300 mg).      Schedule a caregiver to assist you in meal preparation, medication adminitration, etc.     Stop the Triamter

## 2021-02-10 NOTE — PROGRESS NOTES
Patient presents with:  Nausea: has been going on the lst 4 days.  Patient has not vomited  Fatigue: daughter states that the patient is weaker since she has been home  Loss Of Appetite       HPI: Stefani Vieira is here with Reyes Shultz to follow up for isabelle right eye independent movement. Pt c/o double vision. Advised to see eye doctor ASAP. Care needs:  Spent several minutes discussing need for add'l home care and oversight of both pt and  who has dementia.   Dtr aware but has not contacted [facility] TEMPORARY   • Glaucoma     RIGHT   • Hearing impairment    • Hearing loss    • Hepatitis, unspecified     INDUCED BY LIPITOR   • High blood pressure    • High cholesterol    • Hip bursitis, left 1/9/2014   • History of diverticulitis 7/1/2016   • HTN (hype • Dicyclomine HCl 10 MG Oral Cap Take 10 mg by mouth daily. • Phenazopyridine HCl (PYRIDIUM) 200 MG Oral Tab Take one now and one in the am for urinary burning.  2 tablet 0   • levETIRAcetam 500 MG Oral Tab Take 1 tablet (500 mg total) by mouth 2 (two abnormalities    Assessment and Plan:  80year old woman in good physical condition with intact cognition now with weakness, confusion, emotional lability, anorexia after development of left temporal lobe area of \"inflammation/infection\" with negative in 2/17/2021). This visit lasted >26 minutes.     Merary Johnson MD

## 2021-02-10 NOTE — PROGRESS NOTES
[facility] with Antonette  4/26/1931  Primary Care Provider:  Leopoldo Carrie, MD    2/10/2021  Accompanied visit: family    () No.      80year old yo patient being seen for:  En Tab, Take 10 mg by mouth nightly., Disp: , Rfl:   •  latanoprost 0.005 % Ophthalmic Solution, Place 1 drop into both eyes nightly., Disp: , Rfl:   •  Dorzolamide HCl-Timolol Mal 22.3-6.8 MG/ML Ophthalmic Solution, Place 1 drop into both eyes every 12 (twel taken out. The rest of the exam is nonfocal except her gait is slow and broad-based. INTERPRETATION of RELEVANT LABS and other DATA:    Review of the spinal fluid showed a negative herpes simplex virus assay. Bryanna Merchant was likewise negative.       Pr

## 2021-02-17 NOTE — PROGRESS NOTES
Patient presents with: Follow - Up: weakness and anorexia       HPI: Gaston Werner is here for follow up:  She is weak, tired, and has no apptite. She is nauseous. She is up at 830 am, has BF then back  to bed for few hours.   Then up for lunch and back to bed Diverticulitis of colon 11/16/2006   • Diverticulitis of large intestine without perforation or abscess without bleeding 10/1/2018   • Diverticulosis of large intestine 8/28/2017   • Dyslipidemia 2016   • Exposure to radiation    • Fecal incontinence     T Take 1 tablet (150 mg total) by mouth daily. 30 tablet 3   • Pantoprazole Sodium 40 MG Oral Tab EC Take 40 mg by mouth every morning before breakfast.     • Dicyclomine HCl 10 MG Oral Cap Take 10 mg by mouth daily.      • Phenazopyridine HCl (PYRIDIUM) 200 Plan:    80year old woman with acute onset of dizziness and weakness, had negative workup in [facility] for encephalitis, now failing to thrive, for MRI of brain with infusion in the morning. MRI with Gadolinium in am       Family and Dr. Miracle Thompson aware.

## 2021-02-17 NOTE — TELEPHONE ENCOUNTER
PC to Dr. Kevin Wagner: Need for  MRI with Gadolinium in the face of renal insufficiency. After examining her stable-over-many months GFR of 23-28 and creat clearance of about 30 pt is cleared to receive infusion for the MRI of the brain.      PC to pt and dtr and

## 2021-02-18 NOTE — TELEPHONE ENCOUNTER
I informed patient and clarified with patient that her MRI today was at 1pm and she should arrive at 12:30 to register.      Patient had called Dr Renita Davis earlier and was reportedly having a difficult time understanding and actually disconnected phone call a sandy

## 2021-02-18 NOTE — H&P
[facility]  History and Physical     Renee Donald Patient Status:  Inpatient    1931 MRN [id number]   [facility] 4NW-A Attending Rafaela Marcano MD   Hosp Day # 0 PCP Se Harmon MD     Chief Complaint: weakness    Histo Osteoarthritis    • PONV (postoperative nausea and vomiting)     MOTION SICKNESS   • Positional lightheadedness 3/11/2020   • Renal disorder     DAMAGE D/T PRE-ECLAMPSIA  62 YRS AGO   • Right rotator cuff tear arthropathy 8/1/2018   • Visual impairment Comment:constipation    Medications:  No current facility-administered medications on file prior to encounter.      •  PARoxetine HCl (PAXIL) 10 MG Oral Tab, Take 1 tablet (10 mg total) by mouth every morning., Disp: 30 tablet, Rfl: 3    •  buPROPion HCl ER bruits. Respiratory: Clear to auscultation bilaterally. No wheezes. No rhonchi. Cardiovascular: S1, S2. Regular rate and rhythm. No murmurs, rubs or gallops. Equal pulses. Chest and Back: No tenderness or deformity.   Abdomen: Soft, nontender, nondisten Hospitalization - Initial Certification    Patient will require inpatient services that will reasonably be expected to span two midnight's based on the clinical documentation in H+P.    Based on patients current state of illness, I anticipate that, after Ashley Paniagua

## 2021-02-18 NOTE — PATIENT INSTRUCTIONS
Go over to [facility] tomorrow about 12:30 for your MRI. You will get the MRI E-x-ray and the infusion of dye so we can see your brain better. You do not need to take any special precautions.

## 2021-02-18 NOTE — PLAN OF CARE
NURSING ADMISSION NOTE      Patient admitted via Wheelchair  Oriented to room. Safety precautions initiated. Bed in low position. Call light in reach.     1700- this RN contacted by pt's PCP Dr. Nahum Rojas who was able to provide history and complete med r

## 2021-02-19 NOTE — PLAN OF CARE
Alert and oriented x 4. Forgetful at times. VSS. Afebrile. No c/o pain or SOB. C/o weakness when walking. Up with assist to the bathroom. Home medications reordered. Takes pills with no issues. Resting comfortably throughout the night.  Will continue to mon

## 2021-02-19 NOTE — TELEPHONE ENCOUNTER
Course of events, Thursday, Feb. 18, 2021 Thursday: Received phone call from Roge this morning at 8:49 am.  She was confused about the MRI of her brain: what time was it scheduled for, what doctor was she supposed to see, where was she supposed to go.  P then nsg supervisor to inform her of ok to admit. PC to Roge. Advised that we found something on this MRI that we can address now. Pt repeatedly asked and requested that her  Travis Aguilar be allowed to visit.   She asked it if was serious, then asked i

## 2021-02-19 NOTE — PROGRESS NOTES
Noted that pt may be discharged tonight. Unable to reach  on Med-Onc unit at 4:36 pm.  Pt lives independently without support services at [facility].       Her physical condition has deteriorated significantly in the last three weeks: she

## 2021-02-19 NOTE — PROGRESS NOTES
Here to see Maty Yin for new diagnosis left temporal lobe glioma. Pt informed hastily after STAT MRI yesterday that she has tumor of brain not encephalitis and needs to be admitted.   Pt very distressed that  could not be with her and daughter is Severa Ogle

## 2021-02-19 NOTE — CONSULTS
[facility]  Neurosurgery Consult    Ira Gant Patient Status:  Inpatient    1931 MRN [id number]   [facility] 4NW-A Attending Kelvin Murphy MD   Hosp Day # 1 PCP Ally Rob MD     REASON FOR CONSULTATION:  Possible left Hypercholesterolemia 9/1/2016   • IPMN (intraductal papillary mucinous neoplasm) 9/22/2010    Overview:  side, largest 1.2, h.o bx.     • Lumbar radiculopathy 4/18/2016   • Malignant neoplasm of central portion of left breast in female, estrogen receptor po Comment:fatigue  Ragweed                     Comment:Hay fever  Toviaz                  OTHER (SEE COMMENTS)    Comment:constipation    MEDICATIONS:    •  PARoxetine HCl (PAXIL) 10 MG Oral Tab, Take 1 tablet (10 mg total) by mouth every morning., Disp: 30 Daily PRN    •  ondansetron HCl (ZOFRAN) injection 8 mg, 8 mg, Intravenous, Q6H PRN    •  ondansetron HCl (ZOFRAN) injection 8 mg, 8 mg, Intravenous, Q6H PRN    •  Heparin Sodium (Porcine) 5000 UNIT/ML injection 5,000 Units, 5,000 Units, Subcutaneous, Q12H MG 2.2 02/19/2021       IMAGING:  MRI brain w/ w/o 2/18  FINDINGS:       Prominence of the sulci is noted. There is no midline shift or mass effect. The basal cisterns are patent. The craniocervical junction is unremarkable.        Midline Intracranial flow voids are present. VENTRICLES/SULCI:   Ventricles and sulci are prominent in size consistent with volume loss. There are no extra-axial fluid collections. There is no midline shift.   SINUSES/ORBITS:       The visualized paranasal sinus

## 2021-02-19 NOTE — CM/SW NOTE
4:50 PM - received a call from Dr. Twan Ibarra who is concerned with a having a safe discharge plan in place. MD feels patient needs caregivers about 8 hours/day. If caregivers cannot be arranged, the patient may benefit from the [place].  But the patient and sp

## 2021-02-19 NOTE — PLAN OF CARE
Pt alert and oriented x4, drowsy this AM and forgetful at times. [place], hearing aides at home. Vital signs stable, telemetry monitoring. Ambulating to restroom with stand-by assist. PT/OT consulted for eval. Neurosurgery to assess.  Plan of care discussed wit

## 2021-02-19 NOTE — DIETARY NOTE
[facility] ASSESSMENT    Pt does not meet malnutrition criteria at this time. NUTRITION DIAGNOSIS/PROBLEM:  Predicted suboptimal energy intake related to physiologic causes as evidenced by reported poor appetite.     NUTRITION INT discretion    MONITOR AND EVALUATE/NUTRITION GOALS:  1. PO intake to meet at least 75% patient nutrition prescription  2. At least 75% intake of oral supplements  3. No signs of skin breakdown  4.  Maintain lean body mass    MEDICATIONS: Protonix, KCl    LA

## 2021-02-19 NOTE — HOME CARE LIAISON
Ptnt current with [facility] for sn/pt/ot/st  Will need a BRYCE order on or before dc    Thanks  Marlena dexter

## 2021-02-20 NOTE — CM/SW NOTE
02/20/21 1200   Discharge disposition   Expected discharge disposition Home-Health   Name of Facillity/Home Care/Hospice Residential   Discharge transportation Private car

## 2021-02-20 NOTE — PROGRESS NOTES
[facility]  Progress Note     Joan Chaudhry Patient Status:  Inpatient    1931 MRN [id number]   [facility] 4NW-A Attending Tim Espinoza MD   Hosp Day # 2 PCP Antonino Love MD     Chief Complaint: Weakness    S: Patient ha Q12H [address]   • Dicyclomine HCl  10 mg Oral Daily   • Dorzolamide HCl-Timolol Mal  1 drop Both Eyes Q12H   • latanoprost  1 drop Both Eyes Nightly   • levETIRAcetam  500 mg Oral BID   • Pantoprazole Sodium  40 mg Oral QAM AC   • PARoxetine HCl  10 mg Oral QAM

## 2021-02-20 NOTE — PROGRESS NOTES
Up in chair most of day , appetite fair , seen by PT, social work coordinating home care needs w/ daughter , Cleared for discharge , will transport home per personal vehicle w/ family friend Kayla Lora [phone]

## 2021-02-20 NOTE — CM/SW NOTE
12:55pm  MSW spoke to pt's dtr Narcisa's phone # - [phone] who states pt will have a caregiver today from 4 to 8pm. And pt will have 6 hours on Sunday and 6 hours on  Monday. Dtr states that a neighbor will pick her up.  Call Transferred to Rn.

## 2021-02-20 NOTE — PROGRESS NOTES
[facility]  Progress Note     Christos Combs Patient Status:  Inpatient    1931 MRN [id number]   [facility] 4NW-A Attending Eileen Ac MD   Hosp Day # 1 PCP Jhonathan Mcdowell MD     Chief Complaint: Weakness    S: Patient cu Oral Daily   • Dorzolamide HCl-Timolol Mal  1 drop Both Eyes Q12H   • latanoprost  1 drop Both Eyes Nightly   • levETIRAcetam  500 mg Oral BID   • Pantoprazole Sodium  40 mg Oral QAM AC   • PARoxetine HCl  10 mg Oral QAM   • Pravastatin Sodium  10 mg Oral

## 2021-02-20 NOTE — PHYSICAL THERAPY NOTE
PHYSICAL THERAPY QUICK EVALUATION - INPATIENT    Room Number: 403/403-A  Evaluation Date: 2/20/2021  Presenting Problem: weakness  Physician Order: PT Eval and Treat    Problem List  Active Problems:    Benign essential HTN    Glioma ([facility] [address]    Brain mass Surgical History:   Procedure Laterality Date   • APPENDECTOMY  1957   • ENDOSCOPIC ULTRASOUND (EUS) N/A 9/2/2016    Performed by Jan Lee MD at [facility] ENDOSCOPY   • ENDOSCOPIC ULTRASOUND EXAM  9/16    normal GB   • ESOPHAGOGASTRODUODENOSCOPY (EGD) sheets and blankets)?: None   -   Sitting down on and standing up from a chair with arms (e.g., wheelchair, bedside commode, etc.): None   -   Moving from lying on back to sitting on the side of the bed?: None   How much help from another person does the p therapy include recent h/o hsv encephalitis, h/o breast cancer, htn. MRI reveals possible left temporal lobe glioma.   Pt is currently demonstrating independence with all transfers, gait on levels, did recommend to pt to use cane for ambulation to improve

## 2021-02-20 NOTE — PROGRESS NOTES
Pt is A&Ox4. Forgetful at times. Pt uses bilateral hearing aids that were left at home. RA. Pt is NSR on Tele. Pt has a poor appetite on regular diet. No N/V/D. Pt voids, up to the bathroom with walker. Neurosurgery MD explained POC at bedside.  Pt verbaliz

## 2021-02-22 NOTE — DISCHARGE SUMMARY
[facility]  DISCHARGE SUMMARY     Cherri Hennessy Patient Status:  Inpatient    1931 MRN [id number]   [facility] 4NW-A Attending No att. providers found   [facility] Day # 2 PCP Gerardo Urbina MD     Date of Admission: 2021  Gaurav decrease to Xxzkza-Eztfcjrml-Onepfu for 2 weeks, then decrease to Tuesday-Thursday for 2 weeks, continue to decrease then stop. Quantity: 20 g  Refills: 2     latanoprost 0.005 % Soln  Commonly known as: XALATAN      Place 1 drop into both eyes nightly. appointment or you may be charged a $40 No Show Fee. Important: 24 hour notice is required to cancel any appointment or you may be charged a $40 No Show Fee. Please notify your physician office.                   3/12/2021  3:30 PM  EXAM - ESTABLISHED [

## 2021-02-22 NOTE — TELEPHONE ENCOUNTER
Patient reports having a BM yesterday but nurse is unsure due to memory issues. Patient denies pain. Vitals signs WNL  Urine is clear    Nurse will see her 2 x a week for 2 weeks and then once a week.     Patient will also have PT, OT, ST    Nurse is joe

## 2021-02-22 NOTE — PROGRESS NOTES
Initial Post Discharge Follow Up   Discharge Date: 2/20/21  Contact Date: 2/22/2021    Consent Verification:  Assessment Completed With: Patient  HIPAA Verified? Yes    Discharge Dx:  1. Brain lesion  2. Deconditioning  3. Recent HSV encephalitis  4.  H morning. 30 tablet 3   • buPROPion HCl ER, XL, 150 MG Oral Tablet 24 Hr Take 1 tablet (150 mg total) by mouth daily.  30 tablet 3   • Pantoprazole Sodium 40 MG Oral Tab EC Take 40 mg by mouth every morning before breakfast.     • levETIRAcetam 500 MG Oral [facility], [address]  ([address])        Mar 12, 2021  3:30 PM CST Exam - Established with Dominic Gross MD [facility], [address]  ([address])        Mar

## 2021-02-24 NOTE — TELEPHONE ENCOUNTER
Spoke to dtr this am.  Stated that mother had severe pain yesterday in LLQ without nausea, vomiting, fever, chills, diarrhea or constipation. But mother actually did not know when last BM was.  Mother continues to be weak but is eating-soup and crackers fo

## 2021-02-24 NOTE — PROGRESS NOTES
Here for follow up of anorexia, confusion, weakness, somnolence, brain tumor. HPI: Jorge Bell is here with Minh Zhang. She has many questions about her brain tumor-is it cancer, how long do I have, how did it start, did my fall cause it?  Per my conversation wit LUMPECTOMY /RADIATION    • Depression    • Diverticulitis 2016   • Diverticulitis of colon 11/16/2006   • Diverticulitis of large intestine without perforation or abscess without bleeding 10/1/2018   • Diverticulosis of large intestine 8/28/2017   • Dyslip tablet (150 mg total) by mouth daily. 30 tablet 3   • Pantoprazole Sodium 40 MG Oral Tab EC Take 40 mg by mouth every morning before breakfast.     • Pravastatin Sodium 10 MG Oral Tab Take 10 mg by mouth nightly.      • latanoprost 0.005 % Ophthalmic Soluti are not good. Will advise dtr of conversation and that we should consult Hospice as of March 5. Encouraged pt to eat for pleasure anything she desires and try to drink. Will speak to dtr about need for increased caregiving hours.       To call at any

## 2021-02-24 NOTE — TELEPHONE ENCOUNTER
PC to daughter, Ginny Lyons after neuro-onc conference: Jose Luis Aguilar, Dr. Charleen Lerner, Dr. Panchito Grey, medical oncologist, radiologist and pathologist were in attendance.       They reviewed her films and related that lesion was large and removing the tumor would

## 2021-02-24 NOTE — TELEPHONE ENCOUNTER
Pt's daughter requesting a call from Any Kohli regarding mother. Genaro Sebastián states she has questions for  pt's PCP couldn't answer for her. Pls advise.

## 2021-02-25 PROBLEM — R63.0 ANOREXIA: Status: ACTIVE | Noted: 2021-01-01

## 2021-02-25 PROBLEM — R40.0 SOMNOLENCE: Status: ACTIVE | Noted: 2021-01-01

## 2021-02-25 NOTE — TELEPHONE ENCOUNTER
Per home health RN, pt needs levetiracetam refill sent to [facility] on file. Please send today, pt needs tonight's dose.

## 2021-02-25 NOTE — TELEPHONE ENCOUNTER
Medication: Levetiracetam    Date of last refill: 1/24/2021 (#60/0)  [place]    Last office visit: 2/10/2021  Due back to clinic per last office note:  RTC in 6 weeks  Date next office visit schedu

## 2021-02-25 NOTE — PATIENT INSTRUCTIONS
Continue same medications. Try to eat for pleasure and drink more. Call for recurrence of abdominal pain.

## 2021-03-01 NOTE — TELEPHONE ENCOUNTER
V/O for her to see patient once a week for 4 weeks. She will fax her notes to you.     I informed her that Cricket Cevallos and Robi Jeffries will see you on n3/5/21 when daughter comes to town

## 2021-03-05 NOTE — PATIENT INSTRUCTIONS
Increase the Paroxetine to two tabs a day (20mg) then one tab a day from the new prescription. Take the Famotidine 20 mg twice a day     Start the Augmentin 857 mg twice a day and pick the end of prescription at [employer].     Clear liquids tonight then b

## 2021-03-05 NOTE — PROGRESS NOTES
Patient presents with:   Follow - Up: wants to talk about stomach pain, chest pain, pain started on and off since being in the hospital       HPI:   Mina Ryan is here with her daughter Meena Munoz to follow up for new dx of glioma, one episode of vomiting, anor therapist and HHN have expressed their concern re: need for add'l caregiving hours in the home. Dtr will be here for about two weeks now. Depression: Is less tearful and depressed on Paxil 10 and Buproprion 150 mg per day.       End of life care: Give knee 12/30/2013   • PONV (postoperative nausea and vomiting)     MOTION SICKNESS   • Positional lightheadedness 3/11/2020   • Renal disorder     DAMAGE D/T PRE-ECLAMPSIA  62 YRS AGO   • Right rotator cuff tear arthropathy 8/1/2018   • Visual impairment production  Cor: No chest pain, palpitations  Abd: See HPI. Exts: No edema. Skin: No rash, pallor  .      [address]:   Family History   Problem Relation Age of Onset   • Heart Disorder Father          age 80   • Heart Disorder Mother         CHF   • Cancer out diverticulitis  Plan: Trial of Augmentin 875 bid for ten days.     (R40.0) Somnolence, multifactorial due to constitutional effects of glioma and and anticonvulsant  Plan: comfort care           Per Neurologist    (R63.0) Anorexia, may be multifactoria

## 2021-03-06 NOTE — TELEPHONE ENCOUNTER
Pc to dtr at 2:21pm.  No answer. LM to call back. Called home at 3:20 pm and caregiver answered. She has been giving [place] gatorade and tea. Sharona kept down BF of eggs and toast and she made her chicken salad on rye bread and kept that down.   Dtr

## 2021-03-06 NOTE — TELEPHONE ENCOUNTER
Dtr texted me at 7 pm toninght that mother had another emesis incl Sprite she had 45 min before. Vomitus had some bright red matter in it. Wonders if blood. Explained blood in emesis appears dark brown or black due to effect of gastric acid.  Fever was s

## 2021-03-09 NOTE — TELEPHONE ENCOUNTER
[facility] Del[facility]. called to inform Dr. Silas Vazquez that the patient is schedule for her evaluation on Friday 3/12/21 between 1-2 pm. This time was requested by the daughter.

## 2021-03-10 NOTE — PATIENT INSTRUCTIONS
Finish the Augmentin    Continue eating well and doing your Physical Therapy exercises. Keep your appointment with Dr. Lorraine Dewitt. It's ok if you delay the start of Leslie the hospice nurse for one more week.

## 2021-03-10 NOTE — PROGRESS NOTES
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, failure to thrive: anorexia and somnolence,  weight loss    Barrett Rodrigez had nausea, vomiting and a fever to 100.4 last Friday evening after our v History:   Diagnosis Date   • Abdominal aortic atherosclerosis ([facility] [address] 9/1/2016   • Abdominal aortic atherosclerosis ([facility] [address] 9/1/2016   • Abnormal EKG 9/1/2016   • Age-related cataract of left eye 10/31/2019   • Allergic rhinitis    • Anxiety state    • Athero chronic, w/u negative     Reactive depression     Abnormal EEG- predisposition to seizure     Weakness     Cognitive dysfunction, moderate     Glioma ([facility] [address] left temporal lobe     Somnolence     Anorexia      Current Outpatient Medications   Medication Sig F/U with neurology    2.  LLQ abdominal pain rule out chronic abd pain vs diverticulitis, improved with course of Augmentin  Plan: Finish Augmentin     (F32.9) Reactive depression  (primary encounter diagnosis), improved with Paxil and Welbutrin  Plan

## 2021-03-18 NOTE — TELEPHONE ENCOUNTER
PC to [facility], Sharona's dtr for 21 min not including PC to Security at [place] [address]. According to dtr, Jorge Bell has good days and bad. Yesterday she slept a lot. But she is eating better-\"about 1200 maame per day. \"    She appears a b And dtr wll Facetime in. Finally, we discussed need for emergency notification  should one arise for either one of them. She said she had been told by Hospice admitting nurse to call them before the EMTs or anyone else.  Educated that I do not believe

## 2021-03-19 NOTE — PROGRESS NOTES
Conference with Shashank Johnson, Sharona's daughter. 50 minutes face-to-face; 40 min to chart    Shashank Johnson called this morning and requested a conference with me without her parents.      Earlier this afternoon she had a three-way conference with ANTON White at mother to have  Complications related to her glioma.   I educated that I have had commnication from 5 health care professionals: the physical therapist, the home health nurse, 2 hospice nurses and the  that there should be 24-hour care in the h die peacefully in their sleep,which Marizol Anaya stated she hopes will occur. Stoney Bloom could have a seizure or a stroke, she could fall and sustain a scalp laceration which bleeds profusely or break a hip, she could start vomiting or have diarrhea.   Mr. Annemarie Hitchcock, standard of care that I deliver. Again, I re-iterated to the daughter that 24- hour care should be provided and she should take charge and put that in place. She did not agree and did not verbalize that she we take this under consideration.       Sonal BEST

## 2021-03-22 NOTE — TELEPHONE ENCOUNTER
Medication: Levetiracetam 500  mg     Date of last refill:02/25/21 with 1 addt refills  [place]     Last office visit: 2/10/2021  Due back to clinic per last office note:  RTC in 6 weeks  Date n

## 2021-03-23 NOTE — TELEPHONE ENCOUNTER
RN Spoke to the daughter Eliseo Corey (ok per HIPPLAW) and was informed she would like to be called during the visit. Pt wants to be able to listen and ask questions during the visit. Please call the daughter Eliseo Corey at   [phone].  Luke Araiza the business supmal

## 2021-03-24 NOTE — PROGRESS NOTES
[facility] with Antonette  4/26/1931  Primary Care Provider:  Allison Rowell MD    3/24/2021  Accompanied visit:     ( ) No.  [daughter in attendance during entirety of v losartan Potassium 50 MG Oral Tab, Take 1 tablet (50 mg total) by mouth daily. , Disp: 90 tablet, Rfl: 3  •  hydrocortisone 2.5 % External Ointment, Apply to AA of neck BID for 2 weeks, then decrease to Yuttjk-Enismucxt-Vwgehv for 2 weeks, then decrease to treatment relevant to above. Includes explanation of tests as necessary.     3 months follow up    Patient understands that if needed, based on condition and or test results, follow up will be readjusted      Tasneem Bhakta MD  Vascular & General Neurolog

## 2021-03-24 NOTE — TELEPHONE ENCOUNTER
PC to Dr. Ratliff [place] re: counseling appts. He informed me that he completed first one. When he appeared at the door for second visit, no one answered. He called and knocked and no one anwered for five minutes.  He made a second visit to their apartment and the

## 2021-03-26 NOTE — PROGRESS NOTES
Here for follow up of brain tumor, failure to thrive, increased confusion    HPI:  Brooke Daily is here with Elizabeth Elena to follow up for above. They saw Dr. Ca Sánchez two days ago and were told that neither radiation nor medication was indicated.  He suggested an   M repeated that they would \"just pull the cord if there was a crisis. \"  Virginia Genao did not know what his bracelet was for (to call the EMTs). Yvan Yuri states that she is not as depressed or teary as she was before. She is taking the Paxil daily.       She was/i understanding of potential serious medical crises that can arise.) She allows mother to make final decision  re: caregivers, and plans to continue the same caregiving schedule.   I advised that in the event of a medical event they most likely will pull the Osteoarthritis    • Osteoarthritis of knee 12/30/2013   • PONV (postoperative nausea and vomiting)     MOTION SICKNESS   • Positional lightheadedness 3/11/2020   • Renal disorder     DAMAGE D/T PRE-ECLAMPSIA  62 YRS AGO   • Right rotator cuff tear arthropa leukemia   • Cancer Sister          age [de-identified]   • [de-identified] Brother         brain age 80   • Other (Other) Sister         CHF   • Breast Cancer Self         in her 63's         Physical Exam:   /80 (BP Location: Left arm, Patient Position: Sitting, Cuf Bebeto Jesus MD

## 2021-04-05 NOTE — TELEPHONE ENCOUNTER
BRITTANY from [facility] at 8 pm.  She was concerned because she forgot to take one of her morning medications. She did not know the name of the medication and could not locate the bottle. Now she feels faint and just wanted to let me know.    She does not feel l

## 2021-04-07 NOTE — TELEPHONE ENCOUNTER
Patient had called the office earlier this afternoon and spoke with the medical assistant with c/o nausea.   Patient then called me approximately 15 minutes ago stating that her Adamaris Swengel HCL was delivered this afternoon by [address] but she had

## 2021-04-07 NOTE — TELEPHONE ENCOUNTER
Called patient per Dr Deisy Fletcher request. Patient stated that she was still having nausea and weakness. She said that she has had Gatorade today and has been able to keep that down. She did not receive her medication of Zofran at the time of this phone call.  An

## 2021-04-08 NOTE — TELEPHONE ENCOUNTER
[facility] was increased today per Lauren Leahy and González Garcia at [address] caregiving cannot administer medications but can \"cue\" Michael Constantino in [address] if medication is needed  Caregiving is now daily 10am - 7pm and 8pm-8:30pm for a to

## 2021-04-08 NOTE — TELEPHONE ENCOUNTER
Thank you. I have spoken to Jung Vega from [facility] and Luis from [facility] [employer]. re: the current situation, the porgression of Mrs. Marquez's symptoms and the care schedule. I will call [facility].

## 2021-04-08 NOTE — TELEPHONE ENCOUNTER
PC to Stephanie Eastman, daughter of Saurabh Hurtado at 5:09 pm.  She has not returned my call. Therefore, I texted her:     Sent from my iPhone Your mother is not doing well and her needs are beyond the abilities and job description of part-time caregivers.  She ha pm.   She just talked to [facility] and voiced the same concerns for . Grabiel Beltrán. [facility] stated she will extend care-giving hours.  I will only accept transfer to the [facility] as caregivers cannot give compazine suppositiories or Lorazepam liquid to preven

## 2021-05-05 NOTE — PROGRESS NOTES
[facility] with Antonette  4/26/1931  Primary Care Provider:  Maggie Engel MD    5/5/2021  Accompanied visit: Yes- daughter  Previous visit and existing record notes reviewed Neuro as noted above. Medications:      Current Outpatient Medications:   •  LEVETIRACETAM 500 MG Oral Tab, TAKE 1 TABLET BY MOUTH TWICE A DAY, Disp: 60 tablet, Rfl: 1  •  famoTIDine 20 MG Oral Tab, Take 20 mg by mouth 2 (two) times daily. , Disp: , changes    NEURO  NAD  Could not tell me current year, president or month  EOMI  Difficulty hearing  CN II-XII intact  Strength 5/5 bilateral upper extremities  Strength 5/5 right lower extremity  Strength 4+/5 left lower extremity  DTRs 2+ patellar bilate

## 2021-05-06 NOTE — TELEPHONE ENCOUNTER
I placed an order for cmp prior to MRI to check kidney function. MRI Brain order placed for patient. Please let patient know.

## 2021-05-06 NOTE — TELEPHONE ENCOUNTER
Pt had office visit on 5/5/21 and daughter stated pt decided to move forward with MRI imaging and labs. Needs both orders entered in epic. Call pts daughter Myrtle [place] when orders in chart so she can call central scheduling to make appts for pt.     Herman

## 2021-05-07 NOTE — TELEPHONE ENCOUNTER
Spoke with daughter Ramiro Ang (per consent) and informed that patient will need CMP prior to scheduling MRI. Educated on NPO. Daughter states she will take patient for CMP now. Attempted to make walk-in appointment (with [facility]) an unable.

## 2021-05-11 NOTE — TELEPHONE ENCOUNTER
PC to pt: Talked to caregiver, Myra Barragan. Isra Adler does not seem to have any adverse sx from her fall this early morning. She said she is getting weaker and they are having to use the wheelchair now for Isra Adler to get around.   That was started before the

## 2021-05-12 NOTE — TELEPHONE ENCOUNTER
Patient reportedly fell this morning and pulled her emergency cord. Patient had informed the EMT's that she was walking with her walker and her legs became weak and she lowered herself to the floor. She denied injury or pain.     I spoke with caregiver Luis Anderson

## 2021-06-15 NOTE — TELEPHONE ENCOUNTER
Daughter called back and requests that Juan Edmondson from [facility]. continues to follow her Mom along with the Hospice MD and they no longer need your services.     I informed Beverly Toledo to inform [facility] of this information and I am unsure if they yomaira

## 2021-06-16 NOTE — TELEPHONE ENCOUNTER
I am sorry that I will not be following Yvan Welch. As per our subsequent conversation, Yvan Welch will need a doctor on staff at the [facility] to follow her. I'm sure they will inform her of the same.       Rae Lang MD

## 2021-06-24 PROBLEM — M48.062 SPINAL STENOSIS OF LUMBAR REGION WITH NEUROGENIC CLAUDICATION: Status: ACTIVE | Noted: 2019-05-01

## 2021-07-01 ENCOUNTER — TELEPHONE (OUTPATIENT)
Dept: NEUROLOGY | Facility: CLINIC | Age: 86
End: 2021-07-01

## 2022-07-05 NOTE — TELEPHONE ENCOUNTER
----- Message from Jona Palacios MD sent at 9/26/2018  8:56 PM CDT -----  PC to pt: Advised she has acute diverticulitis. She needs to start Augmentin 875 mg bid for 10 days. She also needs to follow a low residue diet and push fluids.   Call if sx wors normal gait and station

## 2023-06-26 NOTE — PROGRESS NOTES
----- Message from Jocelyn Jacobson MA sent at 2023  9:49 AM CDT -----  Regarding: surgery clearance  Belkis Elder  MRN: [id number]  : 1992  PCP: Dacia Knott  Home Phone      [phone]  Work Phone      Not on file.  Mobile          [phone]      MESSAGE: patient would like a sooner appointment, states she has a mandatory meeting at work that day but really needs this appointment done this week.    Please contact patient if there is something sooner     Phone [phone]         Patient presents with:  Medication Follow-Up: discuss medications  Arm Pain: right arm pain for approx. 6 months        HPI: Sara Roya is here for follow up, right shoulder pain and difficult raising it, abnormal weight gain.       Abnormal weight gain:  Has YRS AGO   • Visual impairment     GLASSES       Patient Active Problem List:     Idiopathic scoliosis of lumbar spine     Hypercholesterolemia     Benign hypertension     Upper abdominal pain     LULA (generalized anxiety disorder)     Constipation     Righ °C) (Tympanic)   Resp 16   Ht 58.38\"   Wt 153 lb 1.6 oz   SpO2 97%   BMI 31.58 kg/m²   Alert, in no distress  Lungs: Clear, no rales,rhonchi  Heart: S1S2 regular, no murmur, ectopy  MS: Right arm with pain on abduction and extesion.    Exts: no edema    As

## 2024-11-22 NOTE — TELEPHONE ENCOUNTER
Patient had me speak to the daughter Ford Mention) regarding recent lab results and recommendations as per . Patient did say her symptoms have improved.     Daughter will schedule an appointment after medication is finished to have culture done to re no wheezing/no dyspnea/no cough

## (undated) NOTE — LETTER
Shazia Juan [address]  [address] [facility] [address], [address] [facility]  Authorization for Surgical Operation and Procedure     Date:_01/19/2021____                                                                                                         Time:_12:30__ 4.   Should the need arise during my operation or immediate post-operative period, I also consent to the administration of blood and/or blood products.   Further, I understand that despite careful testing and screening of blood or blood products by taylor 8.   I recognize that in the event my procedure results in extended X-Ray/fluoroscopy time, I may develop a skin reaction. 9.  If I have a Do Not Attempt Resuscitation (DNAR) order in place, that status will be suspended while in the operating room, proc 1. IChanell agree to be cared for by an anesthesiologist, who is specially trained to monitor me and give me medicine to put me to sleep or keep me comfortable during my procedure    I understand that my anesthesiologist is not an employee or age 5. My doctor has explained to me other choices available to me for my care (alternatives).   6. Pregnant Patients (“epidural”):  I understand that the risks of having an epidural (medicine given into my back to help control pain during labor), include itchi

## (undated) NOTE — LETTER
03/17/20        Ressie Commander  [address] [place] [id number]      Dear Gwendolyn Becker,    [address] [facility] records indicate that you have outstanding lab work and or testing that was ordered for you and has not yet been completed:  Orders Placed This Enc

## (undated) NOTE — LETTER
June 11, 2020    [address] [facility] [address]      Dear Taqueria Hebert: The following are the results of your recent tests. Please review the list of test results.   Your result is the value on the left; we have also suppli

## (undated) NOTE — LETTER
July 26, 2019    [address] [place] [address]      Dear Yvan Welch: The following are the results of your recent tests. Please review the list of test results.   Your result is the value on the left; we have also suppli

## (undated) NOTE — LETTER
September 27, 2018    [address] [facility] [address]      Dear Doretha Tenorio: The following are the results of your recent tests. Please review the list of test results.   Your result is the value on the left; we have also s

## (undated) NOTE — LETTER
March 12, 2020    Saleem Mix  [address]      Dear Sahil Rodriguez:  Your blood work is all normal.  Your kidney function is improved because you were off the diuretic and are drinking more.  You are not anemic.   The fol

## (undated) NOTE — IP AVS SNAPSHOT
[address]            (For Outpatient Use Only) Initial Admit Date: 1/18/2021   Inpt/Obs Admit Date: Inpt: 1/18/21 / Obs: N/A   Discharge Date:    Skip Gabe:  [de-identified]   MRN: [de-identified]   CSN: [id number]   CEID: [id number] [employer] INSURANCE   Payor:  Plan:    Group Number:  Insurance Type:    Subscriber Name:  Subscriber :    Subscriber ID:  Pt Rel to Subscriber:    Hospital Account Financial Class: Medicare    2021

## (undated) NOTE — IP AVS SNAPSHOT
Patient Demographics     Address  [address] [address] Phone  [phone] [facility]) *Preferred*  [phone] [facility])      Emergency Contact(s)     Name Relation Home Work Mobile    Luis Daughter [phone]      Gwenette Prader Take 20 mg by mouth 2 (two) times daily. hydrocortisone 2.5 % Oint  Next dose due:  Tonight 1/24/2021      Apply to AA of neck BID for 2 weeks, then decrease to Azpsly-Aerivpmza-Zwgkry for 2 weeks, then decrease to Tuesday-Thursday for 2 weeks, co [id number] Dorzolamide HCl-Timolol Mal (Dorzolamide-Timolol) 22.3-6.8 MG/ML ophthalmic solution 1 drop 01/23/21 2006 Given      [id number] Dorzolamide HCl-Timolol Mal (Dorzolamide-Timolol) 22.3-6.8 MG/ML ophthalmic solution 1 drop 01/24/21 0810 Given      3 Creatinine 1.44 0.55 - 1.02 mg/dL [facility])   BUN/CREA Ratio 15.3 10.0 - 20.0 — [facility])   Calcium, Total 9.3 8.5 - 10.1 mg/dL [facility])   Calculated Osmolality 298 275 - 295 mOsm/kg [facility] ([facility])   GFR, Non- H&P signed by Sanchez Jones MD at 1/18/2021 10:55 PM  Version 1 of 1    Author: Sanchez Jones MD Service: Hospitalist Author Type: Physician    Filed: 1/18/2021 10:55 PM Date of Service: 1/18/2021 10:42 PM Status: Signed    : Sanchez Jones MD ( • Fecal incontinence     TEMPORARY   • Glaucoma     RIGHT   • Hearing impairment    • Hearing loss    • Hepatitis, unspecified     INDUCED BY LIPITOR   • High blood pressure    • High cholesterol    • Hip bursitis, left 1/9/2014   • History of diverticulit Lipitor [Atorvastat*    OTHER (SEE COMMENTS), UNKNOWN    Comment:Drug-induced hepatitis             Drug induced Hepatitis             Caused drug induced hepatitis  Escitalopram            DIZZINESS    Comment:Made her dizzy and sleepy.   Duloxetine Hcl BP (!) 176/81   Pulse 76   Resp 19   Wt 145 lb 8.1 oz (66 kg)   SpO2 97%   BMI 29.00 kg/m²   General: No acute distress. Alert and oriented x 3. HEENT: Normocephalic atraumatic. Moist mucous membranes. EOM-I. PERRLA. Anicteric. Neck: No lymphadenopathy. PHYSICIAN Certification of Need for Inpatient Hospitalization - Initial Certification    Patient will require inpatient services that will reasonably be expected to span two midnight's based on the clinical documentation in H+P.    Based on patients current • Back problem    • Benign hypertension 9/1/2016   • Breast CA ([facility] [address]     dx in her 60's left lumpectomy w/ radiation   • Cancer ([facility] [address] ~2006    LEFT BREAST LUMPECTOMY /RADIATION    • Depression    • Diverticulitis 2016   • Diverticulitis of colon 11/16/2006 brain age 80   • Other (Other) Sister         CHF   • Breast Cancer Self         in her 63's      reports that she has never smoked. She has never used smokeless tobacco. She reports current alcohol use. She reports that she does not use drugs.       A •  ondansetron HCl (ZOFRAN) injection 4 mg, 4 mg, Intravenous, Q6H PRN **OR** Metoclopramide HCl (REGLAN) injection 5 mg, 5 mg, Intravenous, Q8H PRN  •  losartan Potassium (COZAAR) tab 50 mg, 50 mg, Oral, Daily  •  hydrALAzine HCl (APRESOLINE) injection 10 HEENT: Moist mucous membranes. Extraocular muscles are intact. Neck: No swelling, no masses  Respiratory: Non labored, symmetric exursion  Cardiovascular: No irregularities in rhythm  Abdomen: Soft, nontender, nondistended.     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  Initially placed on stroke protocol however imaging not consistent with CVA. S/p lumbar puncture 1/20/20.     Per Epic, patient had a fall two months ago when • Hearing impairment    • Hearing loss    • Hepatitis, unspecified     INDUCED BY LIPITOR   • High blood pressure    • High cholesterol    • Hip bursitis, left 1/9/2014   • History of diverticulitis 7/1/2016   • HTN (hypertension)    • Hypercholesterolemia How much difficulty does the patient currently have. ..  -   Turning over in bed (including adjusting bedclothes, sheets and blankets)?: A Little   -   Sitting down on and standing up from a chair with arms (e.g., wheelchair, bedside commode, etc.): A Billtl Skilled Therapy Provided: Per Rn okay to work with pt. Pt received up in chair and was agreeable to PT session. Pt educated on role of therapy, goals for the session, LE exercises (handout provided), walker management, and discharge planning.  Pt expressed PT Treatment Plan: Bed mobility; Body mechanics; Endurance; Energy conservation;Patient education;Gait training;Strengthening;Transfer training;Balance training  Rehab Potential : Good  Frequency (Obs): 3-5x/week    CURRENT GOALS     Goal #1 Patient is able t EEG: Abnormal EEG due to focal cerebral disturbance left fronto-temporal and presence of sharp and spike-slow wave discharge over the left frontal temporal region consistent with focal seizures    MRI Brain  1. No acute infarct.   2. Abnormal signal within • Right rotator cuff tear arthropathy 8/1/2018   • Visual impairment     GLASSES       Past Surgical History  Past Surgical History:   Procedure Laterality Date   • APPENDECTOMY  1957   • ENDOSCOPIC ULTRASOUND (EUS) N/A 9/2/2016    Performed by Melony Adames · Memory:  impaired working memory and decreased recall of recent events  · Following Commands:  follows one step commands without difficulty  · Perseveration: perseverates during conversation  · Awareness of Errors:  assistance required to identify errors -   Climbing 3-5 steps with a railing?: A Lot       AM-PAC Score:  Raw Score: 17   Approx Degree of Impairment: 50.57%   Standardized Score (AM-PAC Scale): 42.13   CMS Modifier (G-Code): CK    FUNCTIONAL ABILITY STATUS  Gait Assessment   Gait Assistance: M Patient is a 80year old female admitted on 1/18/2021 for confusion, nausea, fatigue, and word finding difficulties. Pertinent comorbidities and personal factors impacting therapy include those listed above in history related to current admission.   In thi Occupational Therapy Notes (last 72 hours) (Notes from 1/21/2021  1:36 PM through 1/24/2021  1:36 PM)      Occupational Therapy Note signed by Josiah Pelaez OT at 1/21/2021  3:13 PM  Version 1 of 1    Author: Josiah Pelaez OT Service: Nadya Guzman Problem List[MM. 1]  Principal Problem:    Acute CVA (cerebrovascular accident) [facility])  Active Problems:    Hyperlipidemia    Essential hypertension    Hyperglycemia    Encephalitis[MM. 2]      Past Medical History[MM. 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 [address] [facility]. Problem Solving:  assistance required to generate solutions and assistance required to implement solutions    VISION  no deficits noted or reported    PERCEPTION  Overall Perception Status:   NT    SENSATION  Light touch:  intact and denied numbness or tin Patient assisted supine to sit with SBA  BP WNL. Patient did initially state she felt lightheaded but this improved. Patient stood up with CGA. Noted to be unsteady with hand hold assist, had LOB. RW used for in-room mobility--see PT note.  CGA to min assi The AM-ED ' '6-Clicks' Inpatient Daily Activity Short Form was completed and  this patient  is demonstrating a  50% degree impairment in activities of daily living. Research supports that patients with this level of impairment often benefit from SNF. Cognitive screening such as SBT, SLUMS , or MOCA  PPE worn by writer: droplet mask, gloves, goggles. Patient wore a droplet mask. [MM.1]       Attribution Key    MM. 1 - Enrique Sebastian OT on 1/21/2021  2:45 PM  MM. 2 - Enrique Sebastian OT on 1/21/2021 SLP observed PO intake of thin liquids and regular solids following WAB. Bolus acceptance was adequate without evidence of anterior bolus loss. Mastication and AP bolus transit were thorough and efficient without evidence of oral residue.  No overt s/s of a HEP A/HEP B Combined 04/11/11     HEP A/HEP B Combined 03/15/11     INFLUENZA 09/23/20     INFLUENZA 10/09/19     INFLUENZA 10/20/16     INFLUENZA 10/17/15     INFLUENZA 11/01/14     INFLUENZA 10/01/13     INFLUENZA 10/11/12     INFLUENZA 09/15/11     INF